# Patient Record
Sex: FEMALE | Race: WHITE | NOT HISPANIC OR LATINO | Employment: FULL TIME | URBAN - METROPOLITAN AREA
[De-identification: names, ages, dates, MRNs, and addresses within clinical notes are randomized per-mention and may not be internally consistent; named-entity substitution may affect disease eponyms.]

---

## 2017-02-22 ENCOUNTER — GENERIC CONVERSION - ENCOUNTER (OUTPATIENT)
Dept: OTHER | Facility: OTHER | Age: 22
End: 2017-02-22

## 2017-03-09 ENCOUNTER — ALLSCRIPTS OFFICE VISIT (OUTPATIENT)
Dept: OTHER | Facility: OTHER | Age: 22
End: 2017-03-09

## 2017-03-09 DIAGNOSIS — N76.0 ACUTE VAGINITIS: ICD-10-CM

## 2017-03-09 DIAGNOSIS — Z11.3 ENCOUNTER FOR SCREENING FOR INFECTIONS WITH PREDOMINANTLY SEXUAL MODE OF TRANSMISSION: ICD-10-CM

## 2017-03-09 DIAGNOSIS — R35.0 FREQUENCY OF MICTURITION: ICD-10-CM

## 2017-03-15 ENCOUNTER — GENERIC CONVERSION - ENCOUNTER (OUTPATIENT)
Dept: OTHER | Facility: OTHER | Age: 22
End: 2017-03-15

## 2017-03-21 ENCOUNTER — ALLSCRIPTS OFFICE VISIT (OUTPATIENT)
Dept: OTHER | Facility: OTHER | Age: 22
End: 2017-03-21

## 2017-03-21 LAB
BACTERIA UR QL AUTO: NORMAL
CLUE CELL (HISTORICAL): NORMAL
HYPHAL YEAST (HISTORICAL): NORMAL
KOH PREP (HISTORICAL): NORMAL
TRICHOMONAS (HISTORICAL): NORMAL
YEAST (HISTORICAL): NORMAL

## 2017-04-05 ENCOUNTER — ALLSCRIPTS OFFICE VISIT (OUTPATIENT)
Dept: OTHER | Facility: OTHER | Age: 22
End: 2017-04-05

## 2017-07-27 ENCOUNTER — GENERIC CONVERSION - ENCOUNTER (OUTPATIENT)
Dept: OTHER | Facility: OTHER | Age: 22
End: 2017-07-27

## 2017-10-12 ENCOUNTER — ALLSCRIPTS OFFICE VISIT (OUTPATIENT)
Dept: OTHER | Facility: OTHER | Age: 22
End: 2017-10-12

## 2017-12-27 ENCOUNTER — GENERIC CONVERSION - ENCOUNTER (OUTPATIENT)
Dept: OTHER | Facility: OTHER | Age: 22
End: 2017-12-27

## 2018-01-09 NOTE — PROGRESS NOTES
Assessment    1  Increased frequency of urination (788 41) (R35 0)   2  Vaginitis (616 10) (N76 0)    Plan  Increased frequency of urination    · (1) URINE CULTURE; Source:Urine, Clean Catch; Status:Active - Retrospective  Authorization; Requested YDF:[id number];    Perform:[facility]; CVK:[id number]; Last Updated By:Awilda León; 3/9/2017 4:29:24 PM;Ordered; For:Increased frequency of urination; Ordered By:Awilda León; Discussion/Summary  Discussion Summary:   26 yo   vaginitis: on speculum exam, erythema of the cervix noted around the os  No abnormal discharge noted, KOH wet mount negative  Waiting for results from [facility]  Release of records from completed today  will review records and see what tests need to be ordered  urinary frequency and urgency: with 500+ leukocytes, +blood, negative nitrites - well send urine for culture and treat as indicated  Pt will call office on Monday, 3/13/17 to inquire about Results from [facility] and inquire urine culture results  Medication SE Review and Pt Understands Tx: The treatment plan was reviewed with the patient/guardian  The patient/guardian understands and agrees with the treatment plan      Chief Complaint  Chief Complaint Free Text Note Form: pt is here with yellow discharge, vaginal itching and irritation and burning upon urination for 1-2 weeks      History of Present Illness  HPI: 26 yo  presents to office for vaginal discharge  She notes she had a yeast infection about 2 months ago, she treated it with OTC monistat which provided her relief  About 4-5 weeks ago she had recurrence of vaginal discharge and went to [facility] to be evaluated where they did a series of tests, but never received results  On exam at the hospital she was told she had a yeast infection and bacterial vaginosis and was prescribed an antibiotic, which she does not recall the name of   She states she completed the course early Feb  Now about for about 2 weeks she notes white creamy discharge with vaginal itching, and increased urinary urgency and frequency but not dysuria  Review of Systems   Female ROS: vaginal discharge, vaginal itching, urinary frequency and feelings of urinary urgency, but no vaginal odor, no nonmenstrual bleeding, no dysuria, no hematuria and no burning sensation during urination  Focused-Female:   Constitutional: no fever, no chills and not feeling tired  Cardiovascular: no chest pain  Respiratory: no shortness of breath  Gastrointestinal: nausea, but no abdominal pain  ROS Reviewed:   ROS reviewed  Active Problems    1  Anemia (285 9) (D64 9)   2  Anxiety (300 00) (F41 9)   3  Asthma (493 90) (J45 909)   4  Encounter for annual routine gynecological examination (V72 31) (Z01 419)   5  Encounter for contraceptive management (V25 9) (Z30 9)   6  Encounter for Depo-Provera contraception (V25 49) (Z30 42)   7  Obesity (278 00) (E66 9)   8  Obesity complicating pregnancy, childbirth, or puerperium, antepartum (649 13,278 00)   (O99 210,E66 9)   9  Postpartum follow-up (V24 2) (Z39 2)   10  Pregnancy (V22 2) (Z33 1)   11  Rh negative status during pregnancy (V23 89) (O09 899)   12  Screen for STD (sexually transmitted disease) (V74 5) (Z11 3)   13  Ventral hernia (553 20) (K43 9)    Past Medical History    1  History of Acute Gonorrhea (098 0)   2  History of Age At First Period 10 Years Old (Menarche)   3  History of Blighted Ovum (631 8)   4  History of Chlamydial Disease (078 88)   5  History of Elective  (635 90)   6  History of Spontaneous  (634 90)  Active Problems And Past Medical History Reviewed: The active problems and past medical history were reviewed and updated today  Surgical History    1  History of Oral Surgery Tooth Extraction  Surgical History Reviewed: The surgical history was reviewed and updated today  Family History  Father    1   No pertinent family history  Family History    2  Family history of Cancer   3  Family history of Diabetes Mellitus (V18 0)   4  Family history of Heart Disease (V17 49)   5  Family history of Hyperlipidemia  Family History Reviewed: The family history was reviewed and updated today  Social History    · Denied: History of Alcohol Use (History)   · Birth Control Method - Intramuscular Injection   · Current Every Day Smoker (305 1)   · Denied: History of Home Environment Domestic Violence   · Sexually Active  Social History Reviewed: The social history was reviewed and updated today  Current Meds   1  MedroxyPROGESTERone Acetate 150 MG/ML Intramuscular Suspension; INJECT 1  ML   Intramuscular every 11 weeks; Recurring Schedule:46Dqg9932 to (Exp:99Ikk1333); Status: IN PROGRESS - Order Generated Ordered   2  MedroxyPROGESTERone Acetate 150 MG/ML Intramuscular Suspension; INJECT   EVERY 11 WEEKS AS DIRECTED; Therapy: 57OKT6127 to (Last Rx:43Ikm7006)  Requested for: 39BSU6925 Ordered   3  Ventolin  (90 Base) MCG/ACT Inhalation Aerosol Solution; INHALE 1 TO 2 PUFFS   EVERY 4 TO 6 HOURS AS NEEDED; Therapy: 07OYZ6067 to (Last Rx:75Dnj1373)  Requested for: 59Jef1346 Ordered  Medication List Reviewed: The medication list was reviewed and updated today  Allergies    1  Amoxicillin CAPS   2  Azithromycin PACK   3  Penicillins   4  Vicodin TABS    5  Animal dander - Cats   6  Animal dander - Dogs   7  Other   8  Peanuts   9  Seasonal   10  Shellfish   11  Soy   12  [address]   13  Wheat    Vitals  Vital Signs    Recorded: 81KUC4193 02:59PM   Heart Rate 83   Systolic 559, LUE, Sitting   Diastolic 82, LUE, Sitting   Weight 185 lb 2 oz   2-20 Weight Percentile 95 %     Physical Exam    Constitutional   General appearance: No acute distress, well appearing and well nourished  Genitourinary   External genitalia: Normal and no lesions appreciated  Vagina: Normal, no lesions or dryness appreciated    slight erythema of the cervix  KOH wet mount: no clue cells noted  Attending Note  Attending Note: Attending Note: I interviewed and examined the patient, I discussed the case with the Resident and reviewed the Resident's note, I supervised the Resident and I agree with the Resident management plan as it was presented to me  Level of Participation: I was present in clinic and examined the patient  Comments/Additional Findings: Pt signed a release of information paper today  I agree with the Resident's note        Future Appointments    Date/Time Provider Specialty Site   05/10/2017 01:15 PM [facility], [address]     Signatures   Electronically signed by : Varney Scheuermann, MD; Mar  9 2017  4:22PM EST                       (Author)    Electronically signed by : Varney Scheuermann, MD; Mar  9 2017  4:29PM EST                       (Author)    Electronically signed by : Charity De Luna MD; Mar  9 2017  5:03PM EST                       (Author)

## 2018-01-10 NOTE — MISCELLANEOUS
Message   Recorded as Task   Date: 01/07/2016 04:31 PM, Created By: Pili Steele   Task Name: Follow Up   Assigned To: Joey Davis   Regarding Patient: Romi Merritt, Status: Active   Comment:    SusanJan - 07 Jan 2016 4:31 PM     TASK CREATED  nomi xie    i'm confused about this pt   Soumya Alvarado   her blood type is A pos   please advise   thanks Dillon Gia - 08 Jan 2016 11:47 AM     TASK REPLIED TO: Previously Assigned To Nam Longoria  I thoughth I saw a type A neg in the scan bin, but I may have mis-read the lab result  If you can double check the scan bin that would help  Thank you  SusanJan - 12 Jan 2016 11:22 AM     TASK REPLIED TO: Previously Assigned To Joey Davis  this is very strange   her type & screen of 12/15/15 shows A neg   type & screen of 8/2014 show A pos  ( you are right, the latest type & screen was just scanned in within the past day  how do you want me to proceed?  dr Lawrence Boland is here & is recommending to repeat it   Nam Longoria - 19 Jan 2016 2:21 PM     TASK REPLIED TO: Previously Assigned To Nam Longoria  OK to manage as Rh positive  Active Problems    1  Anemia (285 9) (D64 9)   2  Asthma (493 90) (J45 909)   3  Obesity (278 00) (E66 9)   4  Obesity complicating pregnancy, childbirth, or puerperium, antepartum (649 13,278 00)   (O99 210,E66 9)   5  Postpartum follow-up (V24 2) (Z39 2)   6  Pregnancy (V22 2) (Z33 1)   7  Rh negative status during pregnancy (656 10) (O36 0190)   8  Ventral hernia (553 20) (K43 9)    Current Meds   1  Dulcolax Stool Softener 100 MG Oral Capsule; TAKE 1 CAPSULE TWICE DAILY; Therapy: 17YLW8444 to (Evaluate:27Jan2016)  Requested for: 59Bzq9248; Last   Rx:80Nng5814 Ordered   2  Ferrous Sulfate 325 (65 Fe) MG Oral Tablet; TAKE 1 TABLET TWICE DAILY WITH   MEALS; Therapy: 93VXJ7216 to (Evaluate:26Apr2016)  Requested for: 10DEG8723; Last   Rx:87Onb2535 Ordered   3   Prenatal 28-0 8 MG Oral Tablet; take 1 tablet by mouth every day; Therapy: 22SGM7418 to (Last Rx:17Nov2015)  Requested for: 31MQR5599 Ordered   4  Ventolin  (90 Base) MCG/ACT Inhalation Aerosol Solution; INHALE 1 TO 2   PUFFS EVERY 4 TO 6 HOURS AS NEEDED; Therapy: 31XGN6992 to (Last Rx:12Jft2304)  Requested for: 87Xcz6528 Ordered    Allergies    1  Amoxicillin CAPS   2  Azithromycin PACK   3  Penicillins   4  Vicodin TABS    5  Animal dander - Cats   6  Animal dander - Dogs   7  Other   8  Peanuts   9  Seasonal   10  Shellfish   11  Soy   12  [address]   13   Wheat    Signatures   Electronically signed by : Cherylene Hang, RN; Jan 20 2016  3:47PM EST                       (Author)

## 2018-01-12 NOTE — MISCELLANEOUS
Message   Recorded as Task   Date: 03/15/2017 01:47 PM, Created By: Lety Can   Task Name: Call Back   Assigned To: [address] Team   Regarding Patient: Radha Bobo, Status: Active   Kaykay Bethea - 15 Mar 2017 1:47 PM     TASK CREATED  Pt  called requesting Urine cx results from 03/09/17   SusanJan - 15 Mar 2017 3:57 PM     TASK EDITED  called pt  -informed of urine culture results  WNL   pt is requesting an apt for STD screening- apt given for 3/21/17        Active Problems    1  Anemia (285 9) (D64 9)   2  Anxiety (300 00) (F41 9)   3  Asthma (493 90) (J45 909)   4  Encounter for annual routine gynecological examination (V72 31) (Z01 419)   5  Encounter for contraceptive management (V25 9) (Z30 9)   6  Encounter for Depo-Provera contraception (V25 49) (Z30 42)   7  Increased frequency of urination (788 41) (R35 0)   8  Obesity (278 00) (E66 9)   9  Obesity complicating pregnancy, childbirth, or puerperium, antepartum (649 13,278 00)   (O99 210,E66 9)   10  Postpartum follow-up (V24 2) (Z39 2)   11  Pregnancy (V22 2) (Z33 1)   12  Rh negative status during pregnancy (V23 89) (O09 899)   13  Screen for STD (sexually transmitted disease) (V74 5) (Z11 3)   14  Vaginitis (616 10) (N76 0)   15  Ventral hernia (553 20) (K43 9)    Current Meds   1  MedroxyPROGESTERone Acetate 150 MG/ML Intramuscular Suspension   (Depo-Provera); INJECT EVERY 11 WEEKS AS DIRECTED; Therapy: 47VYZ1574 to (Last Rx:15Nkg4092)  Requested for: 57KEM7412   Ordered   2  MedroxyPROGESTERone Acetate 150 MG/ML Intramuscular Suspension; INJECT 1  ML   Intramuscular every 11 weeks; Recurring Schedule:81Zye9062 to (Exp:35Zmx2274); Status: IN PROGRESS - Order Generated Ordered   3  Ventolin  (90 Base) MCG/ACT Inhalation Aerosol Solution; INHALE 1 TO 2   PUFFS EVERY 4 TO 6 HOURS AS NEEDED; Therapy: 65STN4524 to (Last Rx:67Xpt0267)  Requested for: 99Fab6366 Ordered    Allergies    1  Amoxicillin CAPS   2  Azithromycin PACK   3  Penicillins   4  Vicodin TABS    5  Animal dander - Cats   6  Animal dander - Dogs   7  Other   8  Peanuts   9  Seasonal   10  Shellfish   11  Soy   12  [address]   13   Wheat    Signatures   Electronically signed by : Kulwinder Lizarraga RN; Mar 15 2017  3:57PM EST                       (Author)

## 2018-01-13 VITALS
SYSTOLIC BLOOD PRESSURE: 115 MMHG | DIASTOLIC BLOOD PRESSURE: 79 MMHG | WEIGHT: 182.25 LBS | HEART RATE: 79 BPM | BODY MASS INDEX: 32.28 KG/M2

## 2018-01-13 VITALS
WEIGHT: 185.13 LBS | HEART RATE: 83 BPM | BODY MASS INDEX: 32.79 KG/M2 | DIASTOLIC BLOOD PRESSURE: 82 MMHG | SYSTOLIC BLOOD PRESSURE: 123 MMHG

## 2018-01-14 VITALS
RESPIRATION RATE: 75 BRPM | DIASTOLIC BLOOD PRESSURE: 85 MMHG | SYSTOLIC BLOOD PRESSURE: 119 MMHG | WEIGHT: 182.38 LBS | BODY MASS INDEX: 32.31 KG/M2

## 2018-01-15 NOTE — MISCELLANEOUS
Reason For Visit  Reason For Visit Free Text Note Form: P/C from [address]- [facility] has accepted case for parenting thru [address]  Visit scheduled for 1/18/16-      Active Problems    1  Anemia (285 9) (D64 9)   2  Asthma (493 90) (J45 909)   3  Obesity (278 00) (E66 9)   4  Obesity complicating pregnancy, childbirth, or puerperium, antepartum (649 13,278 00)   (O99 210,E66 9)   5  Postpartum follow-up (V24 2) (Z39 2)   6  Pregnancy (V22 2) (Z33 1)   7  Rh negative status during pregnancy (656 10) (O36 0190)   8  Ventral hernia (553 20) (K43 9)    Current Meds   1  Dulcolax Stool Softener 100 MG Oral Capsule; TAKE 1 CAPSULE TWICE DAILY; Therapy: 93YXQ9746 to (Evaluate:27Jan2016)  Requested for: 21Hnv8097; Last   Rx:24Qdq2570 Ordered   2  Ferrous Sulfate 325 (65 Fe) MG Oral Tablet; TAKE 1 TABLET TWICE DAILY WITH MEALS; Therapy: 90PEA3937 to (Evaluate:26Apr2016)  Requested for: 11JKL2731; Last   Rx:30Hrq9946 Ordered   3  Prenatal 28-0 8 MG Oral Tablet; take 1 tablet by mouth every day; Therapy: 16YVY3072 to (Last Rx:17Nov2015)  Requested for: 40OXM4462 Ordered   4  Ventolin  (90 Base) MCG/ACT Inhalation Aerosol Solution; INHALE 1 TO 2 PUFFS   EVERY 4 TO 6 HOURS AS NEEDED; Therapy: 66PRM9037 to (Last Rx:48Nve2485)  Requested for: 39Cba7505 Ordered    Allergies    1  Amoxicillin CAPS   2  Azithromycin PACK   3  Penicillins   4  Vicodin TABS    5  Animal dander - Cats   6  Animal dander - Dogs   7  Other   8  Peanuts   9  Seasonal   10  Shellfish   11  Soy   12  [address]   13   Wheat    Signatures   Electronically signed by : BROOKLYNN Jansen; Joey 15 2016  5:01PM EST                       (Author)

## 2018-01-17 NOTE — MISCELLANEOUS
To whom it may concern,   It is OK for Deysi to go back to work, I recommend no lifting over 20 lbs for now       sincerely, Nella Domínguez      Electronically signed by:Louisa Garg  Jan 13 2016 11:31AM EST

## 2018-01-18 NOTE — PROGRESS NOTES
Chief Complaint  patient here for depo      Active Problems    1  Anemia (285 9) (D64 9)   2  Anxiety (300 00) (F41 9)   3  Asthma (493 90) (J45 909)   4  Encounter for annual routine gynecological examination (V72 31) (Z01 419)   5  Encounter for contraceptive management (V25 9) (Z30 9)   6  Encounter for Depo-Provera contraception (V25 49) (Z30 42)   7  Increased frequency of urination (788 41) (R35 0)   8  Obesity (278 00) (E66 9)   9  Obesity complicating pregnancy, childbirth, or puerperium, antepartum (649 13,278 00)   (O99 210,E66 9)   10  Postpartum follow-up (V24 2) (Z39 2)   11  Pregnancy (V22 2) (Z34 90)   12  Rh negative status during pregnancy (V23 89) (O09 899)   13  Screen for STD (sexually transmitted disease) (V74 5) (Z11 3)   14  Vaginitis (616 10) (N76 0)   15  Ventral hernia (553 20) (K43 9)   16  Yeast infection (112 9) (B37 9)    Current Meds   1  MedroxyPROGESTERone Acetate 150 MG/ML Intramuscular Suspension; INJECT   EVERY 11 WEEKS AS DIRECTED; Therapy: 82WSU4366 to (Last Rx:06Oct2017)  Requested for: 09Oct2017 Ordered   2  Ventolin  (90 Base) MCG/ACT Inhalation Aerosol Solution; INHALE 1 TO 2   PUFFS EVERY 4 TO 6 HOURS AS NEEDED; Therapy: 21KJT4845 to (Last Rx:29Wte2368)  Requested for: 67Wpq3978 Ordered    Allergies    1  Amoxicillin CAPS   2  Azithromycin PACK   3  Penicillins   4  Vicodin TABS    5  Animal dander - Cats   6  Animal dander - Dogs   7  Other   8  Peanuts   9  Seasonal   10  Shellfish   11  Soy   12  [address]   13   Wheat    Vitals  Signs    Heart Rate: 62  Systolic: 981, LUE, Sitting  Diastolic: 70, LUE, Sitting  Height: 5 ft 3 in  Weight: 182 lb 3 2 oz  BMI Calculated: 32 28  BSA Calculated: 1 86    Plan  Encounter for Depo-Provera contraception    · MedroxyPROGESTERone Acetate 150 MG/ML Intramuscular Suspension    Signatures   Electronically signed by : Haylee Mendiola, ; Oct 12 2017 11:04AM EST                       (Author)    Electronically signed by : Kelsey Gilmore Jeison Caro; Oct 12 2017  5:57PM EST                       (Author)    Electronically signed by :  Mecca Feldman MD; Oct 17 2017  2:51PM EST                       (Acknowledgement)

## 2018-01-22 VITALS
WEIGHT: 179.13 LBS | SYSTOLIC BLOOD PRESSURE: 113 MMHG | BODY MASS INDEX: 31.73 KG/M2 | DIASTOLIC BLOOD PRESSURE: 90 MMHG | HEART RATE: 96 BPM

## 2018-01-22 VITALS
WEIGHT: 183.38 LBS | BODY MASS INDEX: 32.48 KG/M2 | SYSTOLIC BLOOD PRESSURE: 105 MMHG | DIASTOLIC BLOOD PRESSURE: 65 MMHG | HEART RATE: 96 BPM

## 2018-01-22 VITALS
HEART RATE: 62 BPM | SYSTOLIC BLOOD PRESSURE: 107 MMHG | WEIGHT: 182.2 LBS | BODY MASS INDEX: 32.28 KG/M2 | DIASTOLIC BLOOD PRESSURE: 70 MMHG | HEIGHT: 63 IN

## 2018-01-24 VITALS
WEIGHT: 175.13 LBS | HEART RATE: 62 BPM | BODY MASS INDEX: 31.02 KG/M2 | SYSTOLIC BLOOD PRESSURE: 101 MMHG | DIASTOLIC BLOOD PRESSURE: 63 MMHG

## 2018-02-09 ENCOUNTER — OFFICE VISIT (OUTPATIENT)
Dept: OBGYN CLINIC | Facility: CLINIC | Age: 23
End: 2018-02-09
Payer: COMMERCIAL

## 2018-02-09 VITALS
DIASTOLIC BLOOD PRESSURE: 72 MMHG | HEART RATE: 69 BPM | HEIGHT: 63 IN | SYSTOLIC BLOOD PRESSURE: 106 MMHG | BODY MASS INDEX: 30.02 KG/M2 | WEIGHT: 169.4 LBS

## 2018-02-09 DIAGNOSIS — Z20.2 POSSIBLE EXPOSURE TO STD: ICD-10-CM

## 2018-02-09 DIAGNOSIS — Z01.419 ENCOUNTER FOR GYNECOLOGICAL EXAMINATION WITHOUT ABNORMAL FINDING: Primary | ICD-10-CM

## 2018-02-09 PROCEDURE — 3725F SCREEN DEPRESSION PERFORMED: CPT | Performed by: NURSE PRACTITIONER

## 2018-02-09 PROCEDURE — 99395 PREV VISIT EST AGE 18-39: CPT | Performed by: NURSE PRACTITIONER

## 2018-02-09 NOTE — PROGRESS NOTES
Assessment             Plan      Blood tests: hiv, hep b sag and rpr  Subjective      Chely Barr is a 23 y o    female who presents for annual exam  Periods are irregular, lasting several days  Dysmenorrhea:mild, occurring premenstrually  Reported some vaginal discharge  prior to her menses but denies any itching burning her odor  She would like to return after her menses to be review this and she has a history bacterial vaginosis  She desires STI testing today  Cyclic symptoms include none  No intermenstrual bleeding, spotting, or discharge  The patient reports that there is not domestic violence in her life  Will follow with parents to see if she had Gardasil    Current contraception: Depo-Provera injections  History of abnormal Pap smear: no  Family history of uterine or ovarian cancer: no  Regular self breast exam: no  History of abnormal mammogram: no  Family history of breast cancer: no  History of abnormal lipids: no  Menstrual History:  OB History      Para Term  AB Living    5 2 2 0 3 2    SAB TAB Ectopic Multiple Live Births    2 1 0 0 2         Menarche age: 7  No LMP recorded    Period Pattern: (!) Irregular  Menstrual Control Change Freq (Hours): on Depo    The following portions of the patient's history were reviewed and updated as appropriate: allergies, current medications, past family history, past medical history, past social history, past surgical history and problem list     Review of Systems  Constitutional: negative  Respiratory: negative  Genitourinary:negative     Objective      /72 (BP Location: Left arm, Patient Position: Sitting)   Pulse 69   Ht 5' 3" (1 6 m)   Wt 76 8 kg (169 lb 6 4 oz)   BMI 30 01 kg/m²       General:   alert and oriented, in no acute distress, alert, appears stated age and cooperative   Heart: regular rate and rhythm, S1, S2 normal, no murmur, click, rub or gallop   Lungs: clear to auscultation bilaterally   Abdomen: soft, non-tender, without masses or organomegaly   Vulva: normal   Vagina: normal mucosa, normal discharge   Cervix: anteverted, no cervical motion tenderness and no lesions   Uterus: normal size   Adnexa: normal adnexa            Assessment        GYN exam WNL  Plan      Blood tests: hiv, hep b sag, rpr  Chlamydia specimen  Contraception: Depo-Provera injections  GC specimen  RTO in 2 wks for results and follow up  Next Depo is due 2018  Florence Ingram is a 23 y o  female who presents for annual exam  Periods are irregular, lasting unknown days  Dysmenorrhea:mild, occurring throughout menses  Cyclic symptoms include none  No intermenstrual bleeding, spotting, or discharge  The patient reports that there is not domestic violence in her life  Current contraception: Depo-Provera injections  History of abnormal Pap smear: no  Family history of uterine or ovarian cancer: no  Regular self breast exam: no  History of abnormal mammogram: no  Family history of breast cancer: no  History of abnormal lipids: no  Menstrual History:  OB History      Para Term  AB Living    5 2 2 0 3 2    SAB TAB Ectopic Multiple Live Births    2 1 0 0 2         Menarche age: 7  No LMP recorded  Period Pattern: (!) Irregular  Menstrual Control Change Freq (Hours): on Depo    The following portions of the patient's history were reviewed and updated as appropriate: allergies, current medications, past family history, past medical history, past social history, past surgical history and problem list     Review of Systems  Pertinent items are noted in HPI       Objective      /72 (BP Location: Left arm, Patient Position: Sitting)   Pulse 69   Ht 5' 3" (1 6 m)   Wt 76 8 kg (169 lb 6 4 oz)   BMI 30 01 kg/m²       General:   alert, appears stated age and cooperative   Heart: regular rate and rhythm, S1, S2 normal, no murmur, click, rub or gallop   Lungs: clear to auscultation bilaterally   Abdomen: soft, non-tender, without masses or organomegaly   Vulva: normal   Vagina: normal mucosa, normal discharge   Cervix: no cervical motion tenderness   Uterus: normal size, anteverted   Adnexa: normal adnexa

## 2018-02-12 ENCOUNTER — TELEPHONE (OUTPATIENT)
Dept: OBGYN CLINIC | Facility: CLINIC | Age: 23
End: 2018-02-12

## 2018-02-12 NOTE — TELEPHONE ENCOUNTER
I called pt b/c Gc/chlamydia specimen was collected for her on 2/9, to go to [facility]  Upon further review, her last insurance card that was scanned says her PCP is with [facility]  I spoke with pt and she verified that on her current card, it says that her lab is [facility], and she wants us to send her specimen to [facility]  Will send today

## 2018-03-07 NOTE — PROGRESS NOTES
Discussion/Summary    Regarding blood type: Inpatient labs reviewed [id number] with outpatient labs  Patient's type was A negative per health network, but [facility] Lab shows A positive  This is consistent with a discrepancy in labs when a patient is truly Rh positive with a WEAK-D variant  This does not require treatment with Rhogam  She should be treated as A positive        Signatures   Electronically signed by : Samaria Awad MD; Jan 19 2016 11:53AM EST                       (Author)

## 2018-03-29 ENCOUNTER — CLINICAL SUPPORT (OUTPATIENT)
Dept: OBGYN CLINIC | Facility: CLINIC | Age: 23
End: 2018-03-29
Payer: COMMERCIAL

## 2018-03-29 DIAGNOSIS — Z30.42 ENCOUNTER FOR DEPO-PROVERA CONTRACEPTION: Primary | ICD-10-CM

## 2018-03-29 PROCEDURE — 96372 THER/PROPH/DIAG INJ SC/IM: CPT | Performed by: OBSTETRICS & GYNECOLOGY

## 2018-03-29 RX ORDER — MEDROXYPROGESTERONE ACETATE 150 MG/ML
150 INJECTION, SUSPENSION INTRAMUSCULAR ONCE
Status: COMPLETED | OUTPATIENT
Start: 2018-03-29 | End: 2018-03-29

## 2018-03-29 RX ADMIN — MEDROXYPROGESTERONE ACETATE 150 MG: 150 INJECTION, SUSPENSION INTRAMUSCULAR at 10:53

## 2018-06-20 ENCOUNTER — OFFICE VISIT (OUTPATIENT)
Dept: OBGYN CLINIC | Facility: CLINIC | Age: 23
End: 2018-06-20
Payer: COMMERCIAL

## 2018-06-20 VITALS
SYSTOLIC BLOOD PRESSURE: 98 MMHG | WEIGHT: 171 LBS | BODY MASS INDEX: 30.3 KG/M2 | HEIGHT: 63 IN | HEART RATE: 58 BPM | DIASTOLIC BLOOD PRESSURE: 59 MMHG

## 2018-06-20 DIAGNOSIS — Z30.42 ENCOUNTER FOR DEPO-PROVERA CONTRACEPTION: ICD-10-CM

## 2018-06-20 DIAGNOSIS — R31.9 HEMATURIA, UNSPECIFIED TYPE: ICD-10-CM

## 2018-06-20 DIAGNOSIS — R39.9 URINARY SYMPTOM OR SIGN: Primary | ICD-10-CM

## 2018-06-20 LAB
SL AMB  POCT GLUCOSE, UA: NEGATIVE
SL AMB LEUKOCYTE ESTERASE,UA: NEGATIVE
SL AMB POCT BILIRUBIN,UA: NEGATIVE
SL AMB POCT BLOOD,UA: ABNORMAL
SL AMB POCT CLARITY,UA: CLEAR
SL AMB POCT COLOR,UA: YELLOW
SL AMB POCT KETONES,UA: NEGATIVE
SL AMB POCT NITRITE,UA: NEGATIVE
SL AMB POCT PH,UA: 6.5
SL AMB POCT SPECIFIC GRAVITY,UA: 1.01
SL AMB POCT URINE PROTEIN: NEGATIVE
SL AMB POCT UROBILINOGEN: 0.2

## 2018-06-20 PROCEDURE — 99213 OFFICE O/P EST LOW 20 MIN: CPT | Performed by: NURSE PRACTITIONER

## 2018-06-20 PROCEDURE — 96372 THER/PROPH/DIAG INJ SC/IM: CPT

## 2018-06-20 PROCEDURE — 81002 URINALYSIS NONAUTO W/O SCOPE: CPT | Performed by: NURSE PRACTITIONER

## 2018-06-20 PROCEDURE — 87086 URINE CULTURE/COLONY COUNT: CPT | Performed by: NURSE PRACTITIONER

## 2018-06-20 RX ORDER — NITROFURANTOIN 25; 75 MG/1; MG/1
100 CAPSULE ORAL 2 TIMES DAILY
Qty: 14 CAPSULE | Refills: 0 | Status: SHIPPED | OUTPATIENT
Start: 2018-06-20 | End: 2018-06-27

## 2018-06-20 RX ORDER — ALBUTEROL SULFATE 90 UG/1
1-2 AEROSOL, METERED RESPIRATORY (INHALATION)
COMMUNITY
Start: 2014-07-17

## 2018-06-20 RX ORDER — MEDROXYPROGESTERONE ACETATE 150 MG/ML
150 INJECTION, SUSPENSION INTRAMUSCULAR ONCE
Status: COMPLETED | OUTPATIENT
Start: 2018-06-20 | End: 2018-06-20

## 2018-06-20 RX ADMIN — MEDROXYPROGESTERONE ACETATE 150 MG: 150 INJECTION, SUSPENSION INTRAMUSCULAR at 14:11

## 2018-06-20 NOTE — PROGRESS NOTES
Assessment/Plan:  Will treat for a UTI  Call if symptoms not improved within 2 days  Next Depo due in 11 wks  Annual due 2019  Diagnoses and all orders for this visit:    Urinary symptom or sign  -     POCT urine dip  -     Chlamydia/GC amplified DNA by PCR; Future  -     nitrofurantoin (MACROBID) 100 mg capsule; Take 1 capsule (100 mg total) by mouth 2 (two) times a day for 7 days    Encounter for Depo-Provera contraception  -     medroxyPROGESTERone (DEPO-PROVERA) IM injection 150 mg; Inject 1 mL (150 mg total) into the shoulder, thigh, or buttocks once     Other orders  -     albuterol (VENTOLIN HFA) 90 mcg/act inhaler; Inhale 1-2 puffs          Subjective:      Patient ID: Uli Thacker is a 26 y o  female  HPI  25 yo  has Pelvic pressure for the past 3 weeks,  She denies any vaginal discharge, any itching  She feels like she has to urinate all the time  She does not , she feels a pressure  Denies any flank pain or fever or chills  Has a new partner  Since 2018  Uses condoms sometimes  Currently is on Depo and is due for her dose today  Urine dip is positive for moderate  blood however patient denies seeing any blood in her urine or from her vagina  She had her gallbladder removed 2018  Denies get her STI labs done that were ordered in February  Will Re print an add a new chlamydia and gonorrhea test      The following portions of the patient's history were reviewed and updated as appropriate: allergies, current medications, past family history, past medical history, past social history, past surgical history and problem list     Review of Systems   Genitourinary: Positive for dysuria, frequency and urgency  Negative for difficulty urinating, flank pain, vaginal bleeding, vaginal discharge and vaginal pain           Objective:      BP 98/59 (BP Location: Left arm, Patient Position: Sitting, Cuff Size: Adult)   Pulse 58   Ht 5' 3" (1 6 m)   Wt 77 6 kg (171 lb)   LMP  (LMP Unknown)   Breastfeeding? No   BMI 30 29 kg/m²          Physical Exam   Constitutional: She appears well-developed and well-nourished  Cardiovascular: Normal rate, regular rhythm and normal heart sounds  Pulmonary/Chest: Effort normal and breath sounds normal    Abdominal: Soft  There is no tenderness  Pressure type discomfort suprapubically

## 2018-06-21 LAB — BACTERIA UR CULT: NORMAL

## 2018-06-22 ENCOUNTER — TRANSCRIBE ORDERS (OUTPATIENT)
Dept: LAB | Facility: CLINIC | Age: 23
End: 2018-06-22

## 2018-06-22 ENCOUNTER — APPOINTMENT (OUTPATIENT)
Dept: LAB | Facility: CLINIC | Age: 23
End: 2018-06-22
Payer: COMMERCIAL

## 2018-06-22 DIAGNOSIS — Z20.2 POSSIBLE EXPOSURE TO STD: ICD-10-CM

## 2018-06-22 DIAGNOSIS — R39.9 URINARY SYMPTOM OR SIGN: ICD-10-CM

## 2018-06-22 PROCEDURE — 87389 HIV-1 AG W/HIV-1&-2 AB AG IA: CPT

## 2018-06-22 PROCEDURE — 86592 SYPHILIS TEST NON-TREP QUAL: CPT

## 2018-06-22 PROCEDURE — 87340 HEPATITIS B SURFACE AG IA: CPT

## 2018-06-22 PROCEDURE — 36415 COLL VENOUS BLD VENIPUNCTURE: CPT

## 2018-06-22 PROCEDURE — 87491 CHLMYD TRACH DNA AMP PROBE: CPT | Performed by: NURSE PRACTITIONER

## 2018-06-22 PROCEDURE — 87591 N.GONORRHOEAE DNA AMP PROB: CPT | Performed by: NURSE PRACTITIONER

## 2018-06-23 LAB — HBV SURFACE AG SER QL: NORMAL

## 2018-06-25 LAB
CHLAMYDIA DNA CVX QL NAA+PROBE: NORMAL
HIV 1+2 AB+HIV1 P24 AG SERPL QL IA: NORMAL
N GONORRHOEA DNA GENITAL QL NAA+PROBE: NORMAL
RPR SER QL: NORMAL

## 2018-06-26 ENCOUNTER — OFFICE VISIT (OUTPATIENT)
Dept: OBGYN CLINIC | Facility: CLINIC | Age: 23
End: 2018-06-26
Payer: COMMERCIAL

## 2018-06-26 VITALS
WEIGHT: 174.4 LBS | BODY MASS INDEX: 30.89 KG/M2 | TEMPERATURE: 97.9 F | HEART RATE: 76 BPM | DIASTOLIC BLOOD PRESSURE: 67 MMHG | SYSTOLIC BLOOD PRESSURE: 106 MMHG

## 2018-06-26 DIAGNOSIS — N39.0 URINARY TRACT INFECTION WITHOUT HEMATURIA, SITE UNSPECIFIED: Primary | ICD-10-CM

## 2018-06-26 DIAGNOSIS — M54.5 LOW BACK PAIN, UNSPECIFIED BACK PAIN LATERALITY, UNSPECIFIED CHRONICITY, WITH SCIATICA PRESENCE UNSPECIFIED: Primary | ICD-10-CM

## 2018-06-26 PROBLEM — M54.50 LOW BACK PAIN: Status: ACTIVE | Noted: 2018-06-26

## 2018-06-26 LAB
SL AMB  POCT GLUCOSE, UA: NEGATIVE
SL AMB LEUKOCYTE ESTERASE,UA: NEGATIVE
SL AMB POCT BILIRUBIN,UA: NEGATIVE
SL AMB POCT BLOOD,UA: ABNORMAL
SL AMB POCT CLARITY,UA: ABNORMAL
SL AMB POCT COLOR,UA: ABNORMAL
SL AMB POCT KETONES,UA: NEGATIVE
SL AMB POCT NITRITE,UA: NEGATIVE
SL AMB POCT URINE PROTEIN: NEGATIVE
SL AMB POCT UROBILINOGEN: 0.2

## 2018-06-26 PROCEDURE — 87086 URINE CULTURE/COLONY COUNT: CPT | Performed by: NURSE PRACTITIONER

## 2018-06-26 PROCEDURE — 99213 OFFICE O/P EST LOW 20 MIN: CPT | Performed by: NURSE PRACTITIONER

## 2018-06-26 PROCEDURE — 81002 URINALYSIS NONAUTO W/O SCOPE: CPT | Performed by: NURSE PRACTITIONER

## 2018-06-26 NOTE — PROGRESS NOTES
Assessment/Plan:    Urine culture was contaminated, repeat culture sent today  Call if symptoms get worse or concerned     Diagnoses and all orders for this visit:    Low back pain, unspecified back pain laterality, unspecified chronicity, with sciatica presence unspecified  -     Urine culture  -     POCT urine dip    Other orders  -     Cancel: Urine culture          Subjective:      Patient ID: Rosaura Diego is a 26 y o  female  HPI 72-year-old  here with complaints of lower back pain, she states the pain was worse yesterday where she almost had to leave work early  Today her symptoms are last   Patient was seen on 2018 and started on nitrofurantoin  for a UTI  When last seen she was having pressure which she states that has resolved  She denies fevers or chills  Her temperature today in the office is 97  Her urine came back mixed contaminants x4with colony count greater than 100,000, needs a repeat clean-catch urine  She also had STI testing done at last visit and all was negative  She is a Depo-Provera for contraception  The following portions of the patient's history were reviewed and updated as appropriate: allergies, current medications, past family history, past medical history, past social history, past surgical history and problem list     Review of Systems   Respiratory: Negative  Cardiovascular: Negative  Gastrointestinal: Negative for abdominal pain, nausea and vomiting  Genitourinary: Negative for difficulty urinating, dysuria, pelvic pain, urgency and vaginal discharge  Lower back to mid back pain  Denies any leg pain  Neurological: Negative  Psychiatric/Behavioral: Negative            Objective:      /67 (BP Location: Left arm, Patient Position: Sitting, Cuff Size: Adult)   Pulse 76   Temp 97 9 °F (36 6 °C) (Tympanic)   Wt 79 1 kg (174 lb 6 4 oz)   LMP  (LMP Unknown)   BMI 30 89 kg/m²          Physical Exam   Constitutional: She appears well-nourished  Cardiovascular: Normal rate, regular rhythm and normal heart sounds  Pulmonary/Chest: Effort normal and breath sounds normal    Abdominal: Soft  There is no tenderness  Psychiatric: She has a normal mood and affect  Her behavior is normal      Negative CVAT  Minimal lower back pain, appears to be musculoskeletal when palpated

## 2018-06-26 NOTE — PATIENT INSTRUCTIONS
Clean catch urine resent today  Await results, Call with concerns  Please increase your water intake

## 2018-06-27 ENCOUNTER — TELEPHONE (OUTPATIENT)
Dept: OBGYN CLINIC | Facility: CLINIC | Age: 23
End: 2018-06-27

## 2018-06-27 LAB — BACTERIA UR CULT: NORMAL

## 2018-06-27 NOTE — TELEPHONE ENCOUNTER
----- Message from Trace Araiza, [address] sent at 6/26/2018  7:41 AM EDT -----  Pt needs a repeat urine culture, please call to inform

## 2018-07-02 ENCOUNTER — TELEPHONE (OUTPATIENT)
Dept: OBGYN CLINIC | Facility: CLINIC | Age: 23
End: 2018-07-02

## 2018-07-02 DIAGNOSIS — B37.3 VAGINAL YEAST INFECTION: Primary | ICD-10-CM

## 2018-07-02 RX ORDER — FLUCONAZOLE 150 MG/1
150 TABLET ORAL ONCE
Qty: 1 TABLET | Refills: 0 | Status: SHIPPED | OUTPATIENT
Start: 2018-07-02 | End: 2018-07-02

## 2018-07-02 RX ORDER — FLUCONAZOLE 150 MG/1
150 TABLET ORAL ONCE
Qty: 1 TABLET | Refills: 0 | Status: CANCELLED | OUTPATIENT
Start: 2018-07-02 | End: 2018-07-02

## 2018-07-02 NOTE — TELEPHONE ENCOUNTER
Patient called stating she had been treated for UTI last week  Urinary symptoms are now gone but she is having issues with a yeast infection  D/W Ramón Brunson  RX for diflucan sent to pharmacy

## 2018-09-12 ENCOUNTER — CLINICAL SUPPORT (OUTPATIENT)
Dept: OBGYN CLINIC | Facility: CLINIC | Age: 23
End: 2018-09-12
Payer: COMMERCIAL

## 2018-09-12 DIAGNOSIS — Z30.42 ENCOUNTER FOR SURVEILLANCE OF INJECTABLE CONTRACEPTIVE: Primary | ICD-10-CM

## 2018-09-12 PROCEDURE — 96372 THER/PROPH/DIAG INJ SC/IM: CPT

## 2018-09-12 RX ORDER — MEDROXYPROGESTERONE ACETATE 150 MG/ML
150 INJECTION, SUSPENSION INTRAMUSCULAR ONCE
Status: COMPLETED | OUTPATIENT
Start: 2018-09-12 | End: 2018-09-12

## 2018-09-12 RX ADMIN — MEDROXYPROGESTERONE ACETATE 150 MG: 150 INJECTION, SUSPENSION INTRAMUSCULAR at 11:07

## 2018-09-12 NOTE — PROGRESS NOTES
Depo-Provera      [x]   Patient provided box yes  Paulette Drake    Last  Annual/Pap Date: 02/2018  Gema Pan Last Depo date: 06/20/18   Side effects: none   HCG; if applicable: n/a   Given by: Olayinka Mayberry CMA   Site: Left deltoid   Next appt  due: 11/28/18  

## 2018-11-26 DIAGNOSIS — Z30.42 ENCOUNTER FOR SURVEILLANCE OF INJECTABLE CONTRACEPTIVE: Primary | ICD-10-CM

## 2018-11-26 RX ORDER — MEDROXYPROGESTERONE ACETATE 150 MG/ML
150 INJECTION, SUSPENSION INTRAMUSCULAR
Qty: 1 ML | Refills: 0 | Status: SHIPPED | OUTPATIENT
Start: 2018-11-26 | End: 2019-02-18 | Stop reason: SDUPTHER

## 2019-02-18 DIAGNOSIS — Z30.42 ENCOUNTER FOR SURVEILLANCE OF INJECTABLE CONTRACEPTIVE: ICD-10-CM

## 2019-02-18 RX ORDER — MEDROXYPROGESTERONE ACETATE 150 MG/ML
150 INJECTION, SUSPENSION INTRAMUSCULAR
Qty: 1 ML | Refills: 3 | Status: SHIPPED | OUTPATIENT
Start: 2019-02-18 | End: 2019-02-19 | Stop reason: SDUPTHER

## 2019-02-19 ENCOUNTER — OFFICE VISIT (OUTPATIENT)
Dept: OBGYN CLINIC | Facility: CLINIC | Age: 24
End: 2019-02-19

## 2019-02-19 VITALS
HEIGHT: 64 IN | DIASTOLIC BLOOD PRESSURE: 71 MMHG | WEIGHT: 172.4 LBS | SYSTOLIC BLOOD PRESSURE: 108 MMHG | HEART RATE: 61 BPM | BODY MASS INDEX: 29.43 KG/M2

## 2019-02-19 DIAGNOSIS — Z01.419 ENCOUNTER FOR ANNUAL ROUTINE GYNECOLOGICAL EXAMINATION: Primary | ICD-10-CM

## 2019-02-19 DIAGNOSIS — Z11.3 ROUTINE SCREENING FOR STI (SEXUALLY TRANSMITTED INFECTION): ICD-10-CM

## 2019-02-19 DIAGNOSIS — Z30.42 ENCOUNTER FOR SURVEILLANCE OF INJECTABLE CONTRACEPTIVE: ICD-10-CM

## 2019-02-19 LAB — SL AMB POCT URINE HCG: NORMAL

## 2019-02-19 PROCEDURE — G0124 SCREEN C/V THIN LAYER BY MD: HCPCS | Performed by: PATHOLOGY

## 2019-02-19 PROCEDURE — G0145 SCR C/V CYTO,THINLAYER,RESCR: HCPCS | Performed by: PATHOLOGY

## 2019-02-19 PROCEDURE — 87491 CHLMYD TRACH DNA AMP PROBE: CPT | Performed by: NURSE PRACTITIONER

## 2019-02-19 PROCEDURE — 87591 N.GONORRHOEAE DNA AMP PROB: CPT | Performed by: NURSE PRACTITIONER

## 2019-02-19 PROCEDURE — 81025 URINE PREGNANCY TEST: CPT | Performed by: NURSE PRACTITIONER

## 2019-02-19 PROCEDURE — 99395 PREV VISIT EST AGE 18-39: CPT | Performed by: NURSE PRACTITIONER

## 2019-02-19 PROCEDURE — 3725F SCREEN DEPRESSION PERFORMED: CPT | Performed by: NURSE PRACTITIONER

## 2019-02-19 RX ORDER — MEDROXYPROGESTERONE ACETATE 150 MG/ML
150 INJECTION, SUSPENSION INTRAMUSCULAR ONCE
Status: COMPLETED | OUTPATIENT
Start: 2019-02-19 | End: 2019-02-19

## 2019-02-19 RX ORDER — MEDROXYPROGESTERONE ACETATE 150 MG/ML
150 INJECTION, SUSPENSION INTRAMUSCULAR
Qty: 1 ML | Refills: 3 | Status: SHIPPED | OUTPATIENT
Start: 2019-02-19 | End: 2020-05-26

## 2019-02-19 RX ADMIN — MEDROXYPROGESTERONE ACETATE 150 MG: 150 INJECTION, SUSPENSION INTRAMUSCULAR at 10:18

## 2019-02-19 NOTE — PROGRESS NOTES
ASSESSMENT & PLAN: Juan Soria is a 26 y o  [id number] with normal gynecologic exam     1   Routine well woman exam done today  2  Pap and HPV:  Patient has never had a Pap    Pap was done today  Current ASCCP Guidelines reviewed  If negative due in 3 years  3  STD testing consents to culture for chlamydia gonorrhea,  was done labs for HIV, RPR, hepatitis-B SAG given  4  Gardasil recommendations reviewed patient will check to see if she was vaccinated through her PCP   5  The following were reviewed in today's visit: breast self exam, safe sexual practices, depo contraception, gardisil vaccine  6  Depo Provera 150 mgs 1 ml with 3 RF sent  RTO in 11 wks for next Depo injection  CC:  Annual Gynecologic Examination    HPI: Juan Soria is a 26 y o  [id number] who presents for annual gynecologic examination  She has the following concerns:  none    Health Maintenance:    She wears her seatbelt routinely  She does perform regular monthly self breast exams  She feels safe at home  Past Medical History:   Diagnosis Date    Asthma        Past Surgical History:   Procedure Laterality Date    CHOLECYSTECTOMY      lap diego 2017    HERNIA REPAIR  2016    hernia x3, laparoscopic    WISDOM TOOTH EXTRACTION         OB/Gyn History:    Pt does not have menstrual issues  Irregular with Depo    History of sexually transmitted infection: Yes  Chlamydia and gonorrhea, Trichomonas  History of abnormal pap smears: No  This is 1st pap  Patient is currently sexually active  The current method of family planning is Depo-Provera injections      OB History        5    Para   2    Term   2       0    AB   3    Living   2       SAB   2    TAB   1    Ectopic   0    Multiple   0    Live Births   2                 Family History   Problem Relation Age of Onset    No Known Problems Mother     No Known Problems Father     No Known Problems Sister     No Known Problems Brother        Social History:  Social History     Socioeconomic History    Marital status: Single     Spouse name: Not on file    Number of children: Not on file    Years of education: Not on file    Highest education level: Not on file   Occupational History    Not on file   Social Needs    Financial resource strain: Not on file    Food insecurity:     Worry: Not on file     Inability: Not on file    Transportation needs:     Medical: Not on file     Non-medical: Not on file   Tobacco Use    Smoking status: Former Smoker     Packs/day: 0 00     Types: Cigarettes     Last attempt to quit: 2018     Years since quittin 7    Smokeless tobacco: Never Used    Tobacco comment: Quit smoking a while ago, but recently started lightly smoking 1 month ago   Substance and Sexual Activity    Alcohol use: No    Drug use: No    Sexual activity: Yes     Partners: Male     Birth control/protection: Injection     Comment: says her male partner has more than 1 sexual partner   Lifestyle    Physical activity:     Days per week: Not on file     Minutes per session: Not on file    Stress: Not on file   Relationships    Social connections:     Talks on phone: Not on file     Gets together: Not on file     Attends Gnosticist service: Not on file     Active member of club or organization: Not on file     Attends meetings of clubs or organizations: Not on file     Relationship status: Not on file    Intimate partner violence:     Fear of current or ex partner: Not on file     Emotionally abused: Not on file     Physically abused: Not on file     Forced sexual activity: Not on file   Other Topics Concern    Not on file   Social History Narrative    Not on file     Patient is single  Patient is currently employed home care    Allergies   Allergen Reactions    Penicillins Other (See Comments)     Pt states "I have not taken them since I was very young and am not sure what happens   Probably hives "    Cat Hair Extract     Dog Epithelium     Isoflavones     Nuts     Other      Annotation - [id number]: carrots    Pollen Extract     Wheat Bran     Amoxicillin Hives    Azithromycin Hives    Vicodin [Hydrocodone-Acetaminophen] Hives         Current Outpatient Medications:     albuterol (VENTOLIN HFA) 90 mcg/act inhaler, Inhale 1-2 puffs, Disp: , Rfl:     medroxyPROGESTERone (DEPO-PROVERA) 150 mg/mL injection, Inject 1 mL (150 mg total) into a muscle every 3 (three) months, Disp: 1 mL, Rfl: 3    Review of Systems:  Constitutional :no fever, feels well, no tiredness, no recent weight gain or loss  ENT: no ear ache, no loss of hearing, no nosebleeds or nasal discharge, no sore throat or hoarseness  Cardiovascular: no complaints of slow or fast heart beat, no chest pain, no palpitations, no leg claudication or lower extremity edema  Respiratory: no complaints of shortness of shortness of breath, no BOWEN  Breasts:no complaints of breast pain, breast lump, or nipple discharge  Gastrointestinal: no complaints of abdominal pain, constipation, nausea, vomiting, or diarrhea or bloody stools  Genitourinary : no complaints of dysuria, incontinence, pelvic pain, no dysmenorrhea, vaginal discharge or abnormal vaginal bleeding and as noted in HPI  Musculoskeletal: no complaints of arthralgia, no myalgia, no joint swelling or stiffness, no limb pain or swelling    Integumentary: no complaints of skin rash or lesion, itching or dry skin  Neurological: no complaints of headache, no confusion, no numbness or tingling, no dizziness or fainting    Objective      /71 (BP Location: Right arm, Patient Position: Sitting, Cuff Size: Large)   Pulse 61   Ht 5' 3 5" (1 613 m)   Wt 78 2 kg (172 lb 6 4 oz)   LMP  (LMP Unknown)   BMI 30 06 kg/m²     General:   appears stated age, cooperative, alert normal mood and affect   Neck: normal, supple,trachea midline, no masses   Heart: regular rate and rhythm, S1, S2 normal, no murmur, click, rub or gallop Lungs: clear to auscultation bilaterally   Breasts: normal appearance, no masses or tenderness, Inspection negative, Normal to palpation without dominant masses, Taught monthly breast self examination   Abdomen: soft, non-tender, without masses or organomegaly   Vulva: normal female genitalia, Bartholin's, Urethra, Rotan normal   Vagina: normal vagina, no discharge, exudate, lesion, or erythema   Urethra: normal   Cervix: Normal, no discharge  PAP done  GCC done  Nontender  Uterus: normal size, contour, position, consistency, mobility, non-tender and anteverted   Adnexa: no mass, fullness, tenderness   Lymphatic palpation of lymph nodes in neck, axilla, groin and/or other locations: no lymphadenopathy or masses noted   Skin normal skin turgor and no rashes     Psychiatric orientation to person, place, and time: normal  mood and affect: normal

## 2019-02-20 LAB
C TRACH DNA SPEC QL NAA+PROBE: NEGATIVE
N GONORRHOEA DNA SPEC QL NAA+PROBE: NEGATIVE

## 2019-02-22 LAB
LAB AP GYN PRIMARY INTERPRETATION: ABNORMAL
Lab: ABNORMAL
PATH INTERP SPEC-IMP: ABNORMAL

## 2019-03-26 ENCOUNTER — OFFICE VISIT (OUTPATIENT)
Dept: OBGYN CLINIC | Facility: CLINIC | Age: 24
End: 2019-03-26

## 2019-03-26 VITALS
DIASTOLIC BLOOD PRESSURE: 81 MMHG | HEART RATE: 89 BPM | SYSTOLIC BLOOD PRESSURE: 119 MMHG | HEIGHT: 63 IN | BODY MASS INDEX: 32.07 KG/M2 | WEIGHT: 181 LBS

## 2019-03-26 DIAGNOSIS — R10.2 PELVIC PAIN: Primary | ICD-10-CM

## 2019-03-26 DIAGNOSIS — N93.9 VAGINAL SPOTTING: ICD-10-CM

## 2019-03-26 LAB
SL AMB  POCT GLUCOSE, UA: NEGATIVE
SL AMB LEUKOCYTE ESTERASE,UA: NEGATIVE
SL AMB POCT BILIRUBIN,UA: NEGATIVE
SL AMB POCT BLOOD,UA: ABNORMAL
SL AMB POCT CLARITY,UA: CLEAR
SL AMB POCT COLOR,UA: YELLOW
SL AMB POCT KETONES,UA: NEGATIVE
SL AMB POCT NITRITE,UA: NEGATIVE
SL AMB POCT PH,UA: 7.5
SL AMB POCT SPECIFIC GRAVITY,UA: 1.01
SL AMB POCT URINE PROTEIN: NEGATIVE
SL AMB POCT UROBILINOGEN: 0.2

## 2019-03-26 PROCEDURE — 81002 URINALYSIS NONAUTO W/O SCOPE: CPT | Performed by: FAMILY MEDICINE

## 2019-03-26 PROCEDURE — 99213 OFFICE O/P EST LOW 20 MIN: CPT | Performed by: FAMILY MEDICINE

## 2019-03-26 NOTE — PROGRESS NOTES
Roberta Perez 1995 female MRN: [id number]    Acute Visit    SUBJECTIVE    CC: Dysmenorrhea (pelvic cramping/pressure); Urinary Urgency; and Vaginal Bleeding (spotting after intercourse )      HPI:  Roberta Perez is a 26 y o  female who presented for an acute visit complaining of spotting  Pt reports this has been going on for the past 3 weeks  It occurs mostly after intercourse  She reports she is with the same partner for the past 5 years  It has occurred once within the past 3 weeks unprovoked  Spotting is associated with lower abdominal pressure and some heaviness  Pt reports periods have been irregular on Depo and she experiences spotting for about 3-6 days around the time her next shot is due  Denies vaginal discharge  Recent pap last month showed ASCUS  She is on Depo shots for contraception  She has been on Depo for the past 5 years  Her last Depo shot was last month  Pt also reports having noticed more acne on her face within the past month  Denies dyspareunia but some discomfort with intercourse  Denies Douching or use of sex toys  Denies dysuria, fever, chills, n/v  Denies constipation  HPI     Review of Systems   Constitutional: Negative for chills and fever  HENT: Negative for congestion  Eyes: Negative for visual disturbance  Respiratory: Negative for choking and shortness of breath  Cardiovascular: Negative for chest pain and palpitations  Gastrointestinal: Negative for abdominal pain, blood in stool, diarrhea, nausea and vomiting  Genitourinary: Positive for menstrual problem (Irregular periods on the Depo)  Negative for dysuria, flank pain and vaginal discharge  Dyspareunia: Discomfort  Vaginal bleeding: vaginal spotting  Neurological: Negative for headaches         Historical Information   The patient history was reviewed as follows:  Past Medical History:   Diagnosis Date    Asthma      Past Surgical History:   Procedure Laterality Date    CHOLECYSTECTOMY      lap diego 2017    HERNIA REPAIR  2016    hernia x3, laparoscopic    WISDOM TOOTH EXTRACTION       Family History   Problem Relation Age of Onset    No Known Problems Mother     No Known Problems Father     No Known Problems Sister     No Known Problems Brother       Social History   Social History     Substance and Sexual Activity   Alcohol Use No     Social History     Substance and Sexual Activity   Drug Use No     Social History     Tobacco Use   Smoking Status Former Smoker    Packs/day: 0 00    Types: Cigarettes    Last attempt to quit: 2018    Years since quittin 8   Smokeless Tobacco Never Used   Tobacco Comment    Quit smoking a while ago, but recently started lightly smoking 1 month ago       Medications:   Meds/Allergies   Current Outpatient Medications   Medication Sig Dispense Refill    albuterol (VENTOLIN HFA) 90 mcg/act inhaler Inhale 1-2 puffs      medroxyPROGESTERone (DEPO-PROVERA) 150 mg/mL injection Inject 1 mL (150 mg total) into a muscle every 3 (three) months 1 mL 3     No current facility-administered medications for this visit  Allergies   Allergen Reactions    Penicillins Other (See Comments)     Pt states "I have not taken them since I was very young and am not sure what happens  Probably hives "    Cat Hair Extract     Dog Epithelium     Isoflavones     Nuts     Other      Annotation - [id number]: carrots    Pollen Extract     Wheat Bran     Amoxicillin Hives    Azithromycin Hives    Vicodin [Hydrocodone-Acetaminophen] Hives       OBJECTIVE    Vitals:   Vitals:    19 1547   BP: 119/81   BP Location: Left arm   Patient Position: Sitting   Cuff Size: Adult   Pulse: 89   Weight: 82 1 kg (181 lb)   Height: 5' 3" (1 6 m)       Physical Exam   Constitutional: She appears well-developed and well-nourished  HENT:   Head: Normocephalic and atraumatic  Facial acne   Eyes: Conjunctivae are normal    Neck: Normal range of motion     Cardiovascular: Normal rate, regular rhythm, normal heart sounds and intact distal pulses  Pulmonary/Chest: Effort normal and breath sounds normal    Abdominal: Soft  Bowel sounds are normal  There is no tenderness  Genitourinary: Uterus normal  Cervix exhibits no motion tenderness and no friability  Right adnexum displays no mass and no tenderness  Left adnexum displays no mass and no tenderness  No erythema, tenderness or bleeding in the vagina  No foreign body in the vagina  No signs of injury around the vagina  Vaginal discharge: minimal discharge  Musculoskeletal: Normal range of motion  Neurological: She is alert  Skin: Skin is warm and dry  Vitals reviewed  Lab:  I have personally reviewed all pertinent results     Office Visit on 03/26/2019   Component Date Value Ref Range Status    LEUKOCYTE ESTERASE,UA 03/26/2019 Negative   Final    NITRITE,UA 03/26/2019 Negative   Final    SL AMB POCT UROBILINOGEN 03/26/2019 0 2   Final    POCT URINE PROTEIN 03/26/2019 Negative   Final     PH,UA 03/26/2019 7 5   Final    BLOOD,UA 03/26/2019 Trace   Final    SPECIFIC GRAVITY,UA 03/26/2019 1 010   Final    KETONES,UA 03/26/2019 Negative   Final    BILIRUBIN,UA 03/26/2019 Negative   Final    GLUCOSE, UA 03/26/2019 Negative   Final     COLOR,UA 03/26/2019 Yellow   Final    CLARITY,UA 03/26/2019 Clear   Final   Office Visit on 02/19/2019   Component Date Value Ref Range Status    URINE HCG 02/19/2019 urine   Final    Case Report 02/19/2019    Final                    Value:Gynecologic Cytology Report                       Case: [id number]                                  Authorizing Provider:  OMID Mari   Collected:           02/19/2019 1022              Ordering Location:     [facility]      Received:            02/19/2019 [address] [place] [address] Screen:          MARCELINO Colvin Pathologist:           Vi Borges DO                                                            Specimen:    LIQUID-BASED PAP, SCREENING, Cervix                                                        Primary Interpretation 02/19/2019 Epithelial cell abnormality   Final    Interpretation 02/19/2019 Atypical squamous cells of undetermined significance   Final    Specimen Adequacy 02/19/2019 Satisfactory for evaluation  Endocervical/transformation zone component present  Final    Note 02/19/2019    Final                    Value: This result contains rich text formatting which cannot be displayed here   Additional Information 02/19/2019    Final                    Value: This result contains rich text formatting which cannot be displayed here   N gonorrhoeae, DNA Probe 02/19/2019 Negative  Negative Final    Chlamydia trachomatis, DNA Probe 02/19/2019 Negative  Negative Final       Imaging:  I have personally reviewed all pertinent results  EKG, Pathology, and Other Studies:   I have personally reviewed all pertinent results  Assessment/Plan   Vaginal spotting  Patient reports 3 episodes of vaginal spotting, onset in the past 3 weeks  Provoked by intercourse  Had one unprovoked episode  Reports discomfort with intercourse  On Depo for contraception, last depo shot was a month ago  Denies use of sex toys or douching  Hx of ASCUS last month on Pap  Spotting may be hormone related due to Depo  - Reassured  - Pt to follow up in 1 month  - may consider uterine biopsy if spotting persists  - Pt agrees to the plan  Gavi Peña was seen today for dysmenorrhea, urinary urgency and vaginal bleeding      Diagnoses and all orders for this visit:    Pelvic pain  -     POCT urine dip    Vaginal spotting            - PCP: Karyna Villalobos MD  - Follow-up appointments: [facility]    Future Appointments   Date Time Provider Zuleima Jane   4/23/2019  3:00 PM OBGYN RESIDENT [facility] Nirav Bonner 19   5/7/2019  4:30  [facility] Nirav Bonner 19      _____________________________________________________________________         Jannyedda Alba MD, PGY-1  [facility]   3/26/2019        Please be aware that this note contains text that was dictated and there may be errors pertaining to "sound-alike "words during the dictation process

## 2019-03-26 NOTE — ASSESSMENT & PLAN NOTE
Patient reports 3 episodes of vaginal spotting, onset in the past 3 weeks  Provoked by intercourse  Had one unprovoked episode  Reports discomfort with intercourse  On Depo for contraception, last depo shot was a month ago  Denies use of sex toys or douching  Hx of ASCUS last month on Pap  Spotting may be hormone related due to Depo  - Reassured  - Pt to follow up in 1 month  - may consider uterine biopsy if spotting persists  - Pt agrees to the plan

## 2019-04-23 ENCOUNTER — TELEPHONE (OUTPATIENT)
Dept: OBGYN CLINIC | Facility: CLINIC | Age: 24
End: 2019-04-23

## 2020-05-26 ENCOUNTER — TELEPHONE (OUTPATIENT)
Dept: OBGYN CLINIC | Facility: CLINIC | Age: 25
End: 2020-05-26

## 2020-05-26 ENCOUNTER — OFFICE VISIT (OUTPATIENT)
Dept: OBGYN CLINIC | Facility: CLINIC | Age: 25
End: 2020-05-26

## 2020-05-26 VITALS
SYSTOLIC BLOOD PRESSURE: 103 MMHG | WEIGHT: 148 LBS | HEIGHT: 63 IN | DIASTOLIC BLOOD PRESSURE: 67 MMHG | BODY MASS INDEX: 26.22 KG/M2 | TEMPERATURE: 98.6 F | HEART RATE: 80 BPM

## 2020-05-26 DIAGNOSIS — B96.89 BACTERIAL VAGINOSIS: Primary | ICD-10-CM

## 2020-05-26 DIAGNOSIS — N76.0 BACTERIAL VAGINOSIS: Primary | ICD-10-CM

## 2020-05-26 DIAGNOSIS — Z11.3 SCREEN FOR STD (SEXUALLY TRANSMITTED DISEASE): ICD-10-CM

## 2020-05-26 DIAGNOSIS — N89.8 VAGINAL DISCHARGE: ICD-10-CM

## 2020-05-26 DIAGNOSIS — R35.0 URINARY FREQUENCY: ICD-10-CM

## 2020-05-26 LAB
BV WHIFF TEST VAG QL: ABNORMAL
CLUE CELLS SPEC QL WET PREP: ABNORMAL
PH SMN: 5 [PH]
SL AMB  POCT GLUCOSE, UA: NEGATIVE
SL AMB LEUKOCYTE ESTERASE,UA: NEGATIVE
SL AMB POCT BILIRUBIN,UA: NEGATIVE
SL AMB POCT BLOOD,UA: ABNORMAL
SL AMB POCT CLARITY,UA: CLEAR
SL AMB POCT COLOR,UA: YELLOW
SL AMB POCT KETONES,UA: NEGATIVE
SL AMB POCT NITRITE,UA: NEGATIVE
SL AMB POCT PH,UA: 6
SL AMB POCT SPECIFIC GRAVITY,UA: 1.02
SL AMB POCT URINE PROTEIN: NEGATIVE
SL AMB POCT UROBILINOGEN: NEGATIVE
SL AMB POCT WET MOUNT: ABNORMAL
T VAGINALIS VAG QL WET PREP: ABNORMAL
YEAST VAG QL WET PREP: ABNORMAL

## 2020-05-26 PROCEDURE — 87210 SMEAR WET MOUNT SALINE/INK: CPT | Performed by: NURSE PRACTITIONER

## 2020-05-26 PROCEDURE — 81002 URINALYSIS NONAUTO W/O SCOPE: CPT | Performed by: NURSE PRACTITIONER

## 2020-05-26 PROCEDURE — 87086 URINE CULTURE/COLONY COUNT: CPT | Performed by: NURSE PRACTITIONER

## 2020-05-26 PROCEDURE — 87491 CHLMYD TRACH DNA AMP PROBE: CPT | Performed by: NURSE PRACTITIONER

## 2020-05-26 PROCEDURE — T1015 CLINIC SERVICE: HCPCS | Performed by: NURSE PRACTITIONER

## 2020-05-26 PROCEDURE — 87591 N.GONORRHOEAE DNA AMP PROB: CPT | Performed by: NURSE PRACTITIONER

## 2020-05-26 PROCEDURE — 99213 OFFICE O/P EST LOW 20 MIN: CPT | Performed by: NURSE PRACTITIONER

## 2020-05-26 RX ORDER — METRONIDAZOLE 500 MG/1
500 TABLET ORAL EVERY 12 HOURS SCHEDULED
Qty: 14 TABLET | Refills: 0 | Status: SHIPPED | OUTPATIENT
Start: 2020-05-26 | End: 2020-06-02

## 2020-05-27 LAB
BACTERIA UR CULT: NORMAL
C TRACH DNA SPEC QL NAA+PROBE: NEGATIVE
N GONORRHOEA DNA SPEC QL NAA+PROBE: NEGATIVE

## 2020-06-15 PROBLEM — B96.89 BACTERIAL VAGINOSIS: Status: RESOLVED | Noted: 2020-05-26 | Resolved: 2020-06-15

## 2020-06-15 PROBLEM — N93.9 VAGINAL SPOTTING: Status: RESOLVED | Noted: 2019-03-26 | Resolved: 2020-06-15

## 2020-06-15 PROBLEM — N76.0 BACTERIAL VAGINOSIS: Status: RESOLVED | Noted: 2020-05-26 | Resolved: 2020-06-15

## 2020-06-15 PROBLEM — R35.0 URINARY FREQUENCY: Status: RESOLVED | Noted: 2020-05-26 | Resolved: 2020-06-15

## 2020-06-15 PROBLEM — Z11.3 SCREEN FOR STD (SEXUALLY TRANSMITTED DISEASE): Status: RESOLVED | Noted: 2020-05-26 | Resolved: 2020-06-15

## 2020-06-15 PROBLEM — N89.8 VAGINAL DISCHARGE: Status: RESOLVED | Noted: 2020-05-26 | Resolved: 2020-06-15

## 2020-07-08 ENCOUNTER — TELEPHONE (OUTPATIENT)
Dept: OBGYN CLINIC | Facility: CLINIC | Age: 25
End: 2020-07-08

## 2020-07-16 NOTE — PROGRESS NOTES
ASSESSMENT & PLAN: Felicity Romeo is a 23 y o  [id number] with {NORMAL/ABNORMAL FJLI:63030} gynecologic exam     1   Routine well woman exam done today  2  Pap:  The patient's last pap was  2019  It was abnormal    It was ASCUS  Pap was done today  Current ASCCP Guidelines reviewed  3   STD testing  {DONE/NOT DONE:6814915045::"was not"} done ***  4   Gardasil recommendations reviewed *** {IS/ IS NOT:21966} vaccinated  5  The following were reviewed in today's visit: {Gyn counselin}  6  ***    CC:  Annual Gynecologic Examination    HPI: Felicity Romeo is a 23 y o  [id number] who presents for annual gynecologic examination  Patient was treated for BV on 2020,  Negative urine culture  She has the following concerns:  ***    Health Maintenance:    She wears her seatbelt routinely  She {DOES/DOES FYL:71074} perform {Desc; regular/irre} monthly self breast exams  She feels safe at home  Past Medical History:   Diagnosis Date    Asthma        Past Surgical History:   Procedure Laterality Date    CHOLECYSTECTOMY      lap diego 2017    HERNIA REPAIR  2016    hernia x3, laparoscopic    WISDOM TOOTH EXTRACTION         OB/Gyn History:    Pt {HAS/DOES NOT OQGC:79805} menstrual issues  ***    History of sexually transmitted infection: {YES/NO:20011}  History of abnormal pap smears: {YES/NO:94323} ***  Patient {IS/ IS NOT:96358} currently sexually active  The current method of family planning is {contraception:618607}      OB History        5    Para   2    Term   2       0    AB   3    Living   2       SAB   2    TAB   1    Ectopic   0    Multiple   0    Live Births   2                 Family History   Problem Relation Age of Onset    No Known Problems Mother     No Known Problems Father     No Known Problems Sister     No Known Problems Brother        Social History:  Social History     Socioeconomic History    Marital status: Single     Spouse name: Not on file    Number of children: Not on file    Years of education: Not on file    Highest education level: Not on file   Occupational History    Not on file   Social Needs    Financial resource strain: Not on file    Food insecurity:     Worry: Not on file     Inability: Not on file    Transportation needs:     Medical: Not on file     Non-medical: Not on file   Tobacco Use    Smoking status: Former Smoker     Packs/day: 0 00     Types: Cigarettes     Last attempt to quit: 2018     Years since quittin 1    Smokeless tobacco: Never Used    Tobacco comment: Quit smoking a while ago, but recently started lightly smoking 1 month ago   Substance and Sexual Activity    Alcohol use: No    Drug use: No    Sexual activity: Yes     Partners: Male     Birth control/protection: Injection     Comment: says her male partner has more than 1 sexual partner   Lifestyle    Physical activity:     Days per week: Not on file     Minutes per session: Not on file    Stress: Not on file   Relationships    Social connections:     Talks on phone: Not on file     Gets together: Not on file     Attends Yazdanism service: Not on file     Active member of club or organization: Not on file     Attends meetings of clubs or organizations: Not on file     Relationship status: Not on file    Intimate partner violence:     Fear of current or ex partner: Not on file     Emotionally abused: Not on file     Physically abused: Not on file     Forced sexual activity: Not on file   Other Topics Concern    Not on file   Social History Narrative    Not on file     Patient is {Desc; marital status:62}  Patient is currently {[facility] Employment:05804} ***    Allergies   Allergen Reactions    Penicillins Other (See Comments)     Pt states "I have not taken them since I was very young and am not sure what happens   Probably hives "    Cat Hair Extract     Dog Epithelium     Nuts     Other      Annotation - [id number]: carrots    Pollen Extract     Soy Isoflavones     Wheat Bran     Amoxicillin Hives    Azithromycin Hives    Vicodin [Hydrocodone-Acetaminophen] Hives         Current Outpatient Medications:     albuterol (VENTOLIN HFA) 90 mcg/act inhaler, Inhale 1-2 puffs, Disp: , Rfl:     Review of Systems:  Constitutional :no fever, feels well, no tiredness, no recent weight gain or loss  ENT: no ear ache, no loss of hearing, no nosebleeds or nasal discharge, no sore throat or hoarseness  Cardiovascular: no complaints of slow or fast heart beat, no chest pain, no palpitations, no leg claudication or lower extremity edema  Respiratory: no complaints of shortness of shortness of breath, no BOWEN  Breasts:no complaints of breast pain, breast lump, or nipple discharge  Gastrointestinal: no complaints of abdominal pain, constipation, nausea, vomiting, or diarrhea or bloody stools  Genitourinary : no complaints of dysuria, incontinence, pelvic pain, no dysmenorrhea, vaginal discharge or abnormal vaginal bleeding and as noted in HPI  Musculoskeletal: no complaints of arthralgia, no myalgia, no joint swelling or stiffness, no limb pain or swelling  Integumentary: no complaints of skin rash or lesion, itching or dry skin  Neurological: no complaints of headache, no confusion, no numbness or tingling, no dizziness or fainting    Objective      There were no vitals taken for this visit      General:   appears stated age, cooperative, alert normal mood and affect   Neck: normal, supple,trachea midline, no masses   Heart: regular rate and rhythm, S1, S2 normal, no murmur, click, rub or gallop   Lungs: clear to auscultation bilaterally   Breasts: {EXAM; BREAST:67834}   Abdomen: soft, non-tender, without masses or organomegaly   Vulva: {EXAM; GYN OAASY:41756}   Vagina: {exam; vagina:55975}   Urethra: {Urethra:57713}   Cervix: {PE Cervix:78309::"Normal, no discharge "}   Uterus: {exam; uterus:13048}   Adnexa: {exam; adnexa:45503}   Lymphatic palpation of lymph nodes in neck, axilla, groin and/or other locations: no lymphadenopathy or masses noted   Skin normal skin turgor and no rashes     Psychiatric orientation to person, place, and time: normal  mood and affect: normal

## 2020-07-17 ENCOUNTER — ANNUAL EXAM (OUTPATIENT)
Dept: OBGYN CLINIC | Facility: CLINIC | Age: 25
End: 2020-07-17

## 2020-07-17 VITALS
TEMPERATURE: 97.8 F | HEART RATE: 73 BPM | DIASTOLIC BLOOD PRESSURE: 77 MMHG | SYSTOLIC BLOOD PRESSURE: 125 MMHG | HEIGHT: 63 IN | BODY MASS INDEX: 26.19 KG/M2 | WEIGHT: 147.8 LBS

## 2020-07-17 DIAGNOSIS — Z20.2 POSSIBLE EXPOSURE TO STD: ICD-10-CM

## 2020-07-17 DIAGNOSIS — N76.0 BACTERIAL VAGINOSIS: Primary | ICD-10-CM

## 2020-07-17 DIAGNOSIS — B96.89 BACTERIAL VAGINOSIS: Primary | ICD-10-CM

## 2020-07-17 LAB
BV WHIFF TEST VAG QL: ABNORMAL
CLUE CELLS SPEC QL WET PREP: ABNORMAL
PH SMN: 5 [PH]
SL AMB POCT WET MOUNT: ABNORMAL
T VAGINALIS VAG QL WET PREP: ABNORMAL
YEAST VAG QL WET PREP: ABNORMAL

## 2020-07-17 PROCEDURE — 87491 CHLMYD TRACH DNA AMP PROBE: CPT | Performed by: NURSE PRACTITIONER

## 2020-07-17 PROCEDURE — 87210 SMEAR WET MOUNT SALINE/INK: CPT | Performed by: NURSE PRACTITIONER

## 2020-07-17 PROCEDURE — 99213 OFFICE O/P EST LOW 20 MIN: CPT | Performed by: NURSE PRACTITIONER

## 2020-07-17 PROCEDURE — 87591 N.GONORRHOEAE DNA AMP PROB: CPT | Performed by: NURSE PRACTITIONER

## 2020-07-17 RX ORDER — METRONIDAZOLE 7.5 MG/G
1 GEL VAGINAL DAILY
Qty: 70 G | Refills: 0 | Status: SHIPPED | OUTPATIENT
Start: 2020-07-17 | End: 2020-07-22

## 2020-07-17 NOTE — PATIENT INSTRUCTIONS
Covid - 19 instructions    If you are having any of the following     Cough   Shortness of breath   Fever  If traveled internationally or to high risk [place] states  Or been in contact with someone that has     Please call:    [phone]  option 7    They will screen you and direct you to the nearest testing location     Should not come to the PCP or OB office without calling that number first        Bacterial Vaginosis   WHAT YOU NEED TO KNOW:   Bacterial vaginosis (BV) is an infection in the vagina  It may cause vaginitis, which is irritation and inflammation of the vagina  DISCHARGE INSTRUCTIONS:   Medicines:   · Antibiotics: These are given to kill the bacteria that cause BV  They may be given as a pill or a cream to put in your vagina  Take or use as directed  · Take your medicine as directed  Contact your healthcare provider if you think your medicine is not helping or if you have side effects  Tell him of her if you are allergic to any medicine  Keep a list of the medicines, vitamins, and herbs you take  Include the amounts, and when and why you take them  Bring the list or the pill bottles to follow-up visits  Carry your medicine list with you in case of an emergency  Prevent bacterial vaginosis:   · Keep your vaginal area clean and dry:  Wear underwear and pantyhose with a cotton crotch  Wipe from front to back after you urinate or have a bowel movement  After bathing, rinse soap from your vaginal area to decrease your risk for irritation  · Do not use products that cause irritation:  Always use unscented tampons or sanitary pads  Do not use feminine sprays, powders, or scented tampons because they may cause irritation and increase your risk of BV  Detergents and fabric softeners may also cause irritation  · Do not douche: This can cause an imbalance in healthy vaginal bacteria  · Use latex condoms:   This helps prevent another infection and keeps your partner from getting the infection  Contact your healthcare provider if:   · Your symptoms come back or do not improve with treatment  · You have vaginal bleeding that is not your monthly period  · You have questions or concerns about your condition or care  © 2017 [facility] Information is for End User's use only and may not be sold, redistributed or otherwise used for commercial purposes  All illustrations and images included in CareNotes® are the copyrighted property of A D A M , Inc  or [employer]  The above information is an  only  It is not intended as medical advice for individual conditions or treatments  Talk to your doctor, nurse or pharmacist before following any medical regimen to see if it is safe and effective for you         return to office for annual gyn exam

## 2020-07-17 NOTE — PROGRESS NOTES
Assessment/Plan:    No problem-specific Assessment & Plan notes found for this encounter  Diagnoses and all orders for this visit:    Bacterial vaginosis  -     metroNIDAZOLE (METROGEL) 0 75 % vaginal gel; Insert 1 application into the vagina daily for 5 days  -     POCT wet mount   review to call if symptoms not improved   reviewed prevention   cyst return to office in 2 weeks for annual gyn exam  Possible exposure to STD  -     Chlamydia/GC amplified DNA by PCR; Future  -     Chlamydia/GC amplified DNA by PCR   will call results        Subjective:      Patient ID: Evan Moe is a 23 y o  female  HPI a 29-year-old  1, last sexually active with male, previously had 2 female partners but no longer in those relationships  Currently has Vaginal discharge that is thicker and white and has an odor,  She thinks is fishy  She has had BV in the past Denies any pelvic pain  Denies any fever chills  Consents to cultures for chlamydia gonorrhea  LMP 7/1/2020 x 5 days  Currently not on contraception declines  The following portions of the patient's history were reviewed and updated as appropriate: allergies, current medications, past family history, past medical history, past social history, past surgical history and problem list     Review of Systems   Constitutional: Negative for chills and fever  HENT: Negative for congestion  Respiratory: Negative for cough and shortness of breath  Cardiovascular: Negative  Genitourinary: Positive for vaginal discharge  Negative for dyspareunia, dysuria, genital sores, pelvic pain and urgency  Neurological: Negative for dizziness  Objective:      /77 (BP Location: Right arm, Patient Position: Sitting, Cuff Size: Standard)   Pulse 73   Temp 97 8 °F (36 6 °C) (Temporal)   Ht 5' 3" (1 6 m)   Wt 67 kg (147 lb 12 8 oz)   BMI 26 18 kg/m²          Physical Exam   Constitutional: She is oriented to person, place, and time   She appears well-nourished  Cardiovascular: Normal rate and regular rhythm  Pulmonary/Chest: Effort normal and breath sounds normal    Abdominal: Soft  There is no tenderness  Neurological: She is alert and oriented to person, place, and time  Skin: Skin is warm and dry        External genitalia- no lesions   vagina- moderate amount creamy white discharge   cervix- no lesions negative CMT   uterus- anteverted, nontender   adnexa- no masses nontender     Wet mount KOH- positive for BV, negative for yeast, negative for trich

## 2020-07-19 LAB
C TRACH DNA SPEC QL NAA+PROBE: NEGATIVE
N GONORRHOEA DNA SPEC QL NAA+PROBE: NEGATIVE

## 2020-07-20 ENCOUNTER — TELEPHONE (OUTPATIENT)
Dept: OBGYN CLINIC | Facility: CLINIC | Age: 25
End: 2020-07-20

## 2020-07-20 NOTE — TELEPHONE ENCOUNTER
----- Message from Katie Wiggins [address] sent at 7/19/2020 10:01 PM EDT -----  Result is negative, please call patient to inform    Thanks

## 2020-08-06 ENCOUNTER — TELEPHONE (OUTPATIENT)
Dept: OBGYN CLINIC | Facility: CLINIC | Age: 25
End: 2020-08-06

## 2020-10-12 ENCOUNTER — ANNUAL EXAM (OUTPATIENT)
Dept: OBGYN CLINIC | Facility: CLINIC | Age: 25
End: 2020-10-12

## 2020-10-12 DIAGNOSIS — Z01.419 ENCOUNTER FOR ANNUAL ROUTINE GYNECOLOGICAL EXAMINATION: Primary | ICD-10-CM

## 2020-10-12 DIAGNOSIS — Z72.51 HIGH RISK HETEROSEXUAL BEHAVIOR: ICD-10-CM

## 2020-10-12 PROCEDURE — 87491 CHLMYD TRACH DNA AMP PROBE: CPT | Performed by: NURSE PRACTITIONER

## 2020-10-12 PROCEDURE — G0145 SCR C/V CYTO,THINLAYER,RESCR: HCPCS | Performed by: NURSE PRACTITIONER

## 2020-10-12 PROCEDURE — 87591 N.GONORRHOEAE DNA AMP PROB: CPT | Performed by: NURSE PRACTITIONER

## 2020-10-12 PROCEDURE — 99395 PREV VISIT EST AGE 18-39: CPT | Performed by: NURSE PRACTITIONER

## 2020-10-13 ENCOUNTER — TELEPHONE (OUTPATIENT)
Dept: OBGYN CLINIC | Facility: CLINIC | Age: 25
End: 2020-10-13

## 2020-10-13 LAB
C TRACH DNA SPEC QL NAA+PROBE: NEGATIVE
N GONORRHOEA DNA SPEC QL NAA+PROBE: NEGATIVE

## 2020-10-20 ENCOUNTER — TELEPHONE (OUTPATIENT)
Dept: OBGYN CLINIC | Facility: CLINIC | Age: 25
End: 2020-10-20

## 2020-10-20 LAB
LAB AP GYN PRIMARY INTERPRETATION: NORMAL
Lab: NORMAL

## 2023-09-11 ENCOUNTER — ULTRASOUND (OUTPATIENT)
Dept: OBGYN CLINIC | Facility: MEDICAL CENTER | Age: 28
End: 2023-09-11
Payer: COMMERCIAL

## 2023-09-11 VITALS — BODY MASS INDEX: 29.62 KG/M2 | DIASTOLIC BLOOD PRESSURE: 72 MMHG | WEIGHT: 167.2 LBS | SYSTOLIC BLOOD PRESSURE: 136 MMHG

## 2023-09-11 DIAGNOSIS — N91.2 AMENORRHEA: Primary | ICD-10-CM

## 2023-09-11 PROCEDURE — 76817 TRANSVAGINAL US OBSTETRIC: CPT | Performed by: STUDENT IN AN ORGANIZED HEALTH CARE EDUCATION/TRAINING PROGRAM

## 2023-09-11 PROCEDURE — 99212 OFFICE O/P EST SF 10 MIN: CPT | Performed by: STUDENT IN AN ORGANIZED HEALTH CARE EDUCATION/TRAINING PROGRAM

## 2023-09-11 NOTE — PROGRESS NOTES
Patient here for early ultrasound. LMP: 6/20/23  GA: 79A5S  ADDIE: 3/26/24  [id number]  Unplanned Pregnancy  Regular Cycles  Patient has Nausea, vomiting, Breast Tenderness. Denies Bleeding, Cramping  Accompanied by a friend who is adopting the baby.

## 2023-09-14 ENCOUNTER — TELEPHONE (OUTPATIENT)
Facility: HOSPITAL | Age: 28
End: 2023-09-14

## 2023-09-14 NOTE — TELEPHONE ENCOUNTER
Called patient to schedule MFM appointment, based on referral issued to Maternal Fetal Medicine by [facility] office. Left voicemail requesting patient to call back and schedule appointment, with office number for return call [phone].

## 2023-09-20 ENCOUNTER — TELEPHONE (OUTPATIENT)
Facility: HOSPITAL | Age: 28
End: 2023-09-20

## 2023-09-20 NOTE — TELEPHONE ENCOUNTER
Called patient to schedule MFM appointment, based on referral issued to Maternal Fetal Medicine by [facility] office. Spoke with patient, patient prefers to call back to schedule. Universal Safety Interventions

## 2023-09-21 ENCOUNTER — INITIAL PRENATAL (OUTPATIENT)
Dept: OBGYN CLINIC | Facility: CLINIC | Age: 28
End: 2023-09-21

## 2023-09-21 VITALS — HEIGHT: 63 IN | BODY MASS INDEX: 29.62 KG/M2

## 2023-09-21 DIAGNOSIS — Z34.81 PRENATAL CARE, SUBSEQUENT PREGNANCY, FIRST TRIMESTER: Primary | ICD-10-CM

## 2023-09-21 PROCEDURE — OBC

## 2023-09-21 RX ORDER — DIPHENHYDRAMINE HCL 25 MG
25 TABLET ORAL EVERY 6 HOURS PRN
COMMUNITY

## 2023-09-21 RX ORDER — LORATADINE 10 MG/1
10 TABLET ORAL DAILY
COMMUNITY

## 2023-09-21 NOTE — PROGRESS NOTES
OB INTAKE INTERVIEW  Patient is 28 y.o.y.o. who presents for OB intake at 11-6 wks  She is accompanied by phone during this encounter  The father of her baby (pt does not wish to name FOB) is not  involved in the pregnancy . Y0S0 SAB2  4 elective terminations  Last Menstrual Period: 6/20/23  Ultrasound: Measured 11 weeks 6 days on 9/11/23  Estimated Date of Delivery: 3/26/24 via LMP & US        Signs/Symptoms of Pregnancy      Current pregnancy symptoms: nausea- vit B6, unisom protocol reviewed. Constipation no  Headaches no  Cramping/spotting no  PICA cravings no    Diabetes-    If patient has 1 or more, please order early 1 hour GTT  History of GDM YES  BMI >35 no  History of PCOS or current metformin use no  History of LGA/macrosomic infant (4000g/9lbs) no    If patient has 2 or more, please order early 1 hour GTT  BMI>30 no  AMA no  First degree relative with type 2 diabetes YES- pt's Dad  History of chronic HTN, hyperlipidemia, elevated A1C no  High risk race (, , ,  or ) YES    Hypertension- if you answer yes, please order preeclampsia labs (cbc, comprehensive metabolic panel, urine protein creatinine ratio, uric acid)  History of of chronic HTN no  History of gestational HTN no  History of preeclampsia, eclampsia, or HELLP syndrome no  History of diabetes no  History of lupus, autoimmune disease, kidney disease no    Thyroid- if yes order TSH with reflex T4  History of thyroid disease no    Bleeding Disorder or Hx of DVT-patient or first degree relative with history of. Order the following if not done previously.    (Factor V, antithrombin III, prothrombin gene mutation, protein C and S Ag, lupus anticoagulant, anticardiolipin, beta-2 glycoprotein)   no    OB/GYN-  History of abnormal pap smear YES       Date of last pap smear 10/20/2020  wnl  History of HPV no  History of Herpes/HSV no  History of other STI (gonorrhea, chlamydia, trich) YES-gonorrhea tx'd  History of prior  YES  History of prior  no  History of  delivery prior to 36 weeks 6 days no  History of blood transfusion no  Ok for blood transfusion yes    Substance screening- if yes outside of tobacco for her or anyone in her home-order urine drug screen  History of tobacco use YES  Currently using tobacco YES-occas use  Currently using alcohol no  Presently using drugs no  Past drug use  Yes- marijuana  IV drug use- no  Partner drug use YES- marijuana  Parent/Family drug use no    MRSA Screening-   Does the pt have a hx of MRSA? no  If yes- please follow MRSA protocol and obtain a nasal swab for MRSA culture    Immunizations:  Influenza vaccine given this season no  Discussed Tdap vaccine yes  Discussed COVID Vaccine yes-pt declined    Genetic/MFM-  Do you or your partner have a history of any of the following in yourselves or first degree relatives? Cystic fibrosis no  Spinal muscular atrophy no  Hemoglobinopathy/Sickle Cell/Thalassemia no  Fragile X Intellectual Disability no    If yes, discuss carrier screening and recommend consultation with MFM/genetic counseling. If no, discuss option for carrier screening and/or genetic testing with Nuchal Ultrasound. Patient interested ?   Appointment at [address] [place] [address] made no    Interview education  [facility] Pregnancy Essentials Book reviewed, discussed and attached to their AVS yes    Nurse/Family Partnership- patient may qualify yes; referral placed yes    Prenatal lab work scripts yes  Extra labs ordered:  One hour glucola, UDS    Aspirin/Preeclampsia Screen    Risk Level Risk Factor Recommendation   LOW Prior Uncomplicated full-term delivery YES No Aspirin recommendation        MODERATE Nulliparity no Recommend low-dose aspirin if     BMI>30 no 2 or more moderate risk factors    Family History Preeclampsia (mother/sister) no     35yr old or greater no      or Low Socioeconomic YES     IVF Pregnancy  no     Personal History Risks (low birth weight, prior adverse preg outcome, >10yr preg interval) no         HIGH History of Preeclampsia no Recommend low-dose aspirin if     Multifetal gestation no 1 or more high risk factors    Chronic HTN no     Type 1 or 2 Diabetes no     Renal Disease no     Autoimmune Disease  no      Contraindications to ASA therapy:  NSAID/ ASA allergy: no  Nasal polyps: no  Asthma with history of ASA induced bronchospasm: no  Relative contraindications:  History of GI bleed: no  Active peptic ulcer disease: no  Severe hepatic dysfunction: no    Patient should be recommended to take ASA 162mg during this pregnancy from 12-36wks to lower her risk of preeclampsia: no      The patient has a history now or in prior pregnancy notable for: SAB2  Elective termination 4,  Hx of 2 prior vag hai. Hx of GDM, Hx of THC use- "weaning off now.",  Pt currently in therapy for hx of depression. Trying to wear off THC & cigarettes. Details that I feel the provider should be aware of:  Pt plans on Adoption. Pt declined CF & SMA testing. PN1 visit scheduled. The patient was oriented to our practice, reviewed delivering physicians and V[facility]ibility for Delivery. All questions were answered. PN phone interview completed. Pt does not want to name FOB- not in a relationship with him. Plans on adopting baby out. PN bldwk ordered in epic, including a one hour glucose, & UDS. Encouraged pt to have bldwk drawn prior to PN1 visit, scheduled for 10/11/23. Referral was sent to [facility]- pt was offered an appt for seq screen, but not able to make appt. Advised pt to call with any further questions/concerns.          Interviewed by: Armando Caldwell RN, [address] [place] [address]

## 2023-10-11 PROBLEM — Z01.419 ENCOUNTER FOR ANNUAL ROUTINE GYNECOLOGICAL EXAMINATION: Status: RESOLVED | Noted: 2019-02-19 | Resolved: 2023-10-11

## 2023-10-11 PROBLEM — O09.292 H/O GESTATIONAL DIABETES IN PRIOR PREGNANCY, CURRENTLY PREGNANT, SECOND TRIMESTER: Status: ACTIVE | Noted: 2023-10-11

## 2023-10-11 PROBLEM — Z86.32 H/O GESTATIONAL DIABETES IN PRIOR PREGNANCY, CURRENTLY PREGNANT, SECOND TRIMESTER: Status: ACTIVE | Noted: 2023-10-11

## 2023-10-11 PROBLEM — Z11.3 ROUTINE SCREENING FOR STI (SEXUALLY TRANSMITTED INFECTION): Status: RESOLVED | Noted: 2019-02-19 | Resolved: 2023-10-11

## 2023-10-30 ENCOUNTER — TELEPHONE (OUTPATIENT)
Dept: OBGYN CLINIC | Facility: CLINIC | Age: 28
End: 2023-10-30

## 2023-10-30 NOTE — TELEPHONE ENCOUNTER
I am working on the overdue bin and this patent was a no show for her initial prenatal exam. She has no other appt's scheduled. She also has not completed her early prenatal labs.

## 2023-12-01 ENCOUNTER — ROUTINE PRENATAL (OUTPATIENT)
Dept: PERINATAL CARE | Facility: OTHER | Age: 28
End: 2023-12-01
Payer: COMMERCIAL

## 2023-12-01 VITALS
BODY MASS INDEX: 32.6 KG/M2 | SYSTOLIC BLOOD PRESSURE: 104 MMHG | HEIGHT: 63 IN | WEIGHT: 184 LBS | HEART RATE: 98 BPM | DIASTOLIC BLOOD PRESSURE: 70 MMHG

## 2023-12-01 DIAGNOSIS — Z36.86 ENCOUNTER FOR ANTENATAL SCREENING FOR CERVICAL LENGTH: ICD-10-CM

## 2023-12-01 DIAGNOSIS — Z36.3 ENCOUNTER FOR ANTENATAL SCREENING FOR MALFORMATION: ICD-10-CM

## 2023-12-01 DIAGNOSIS — Z3A.23 23 WEEKS GESTATION OF PREGNANCY: ICD-10-CM

## 2023-12-01 DIAGNOSIS — O09.292 H/O GESTATIONAL DIABETES IN PRIOR PREGNANCY, CURRENTLY PREGNANT, SECOND TRIMESTER: Primary | ICD-10-CM

## 2023-12-01 DIAGNOSIS — Z86.32 H/O GESTATIONAL DIABETES IN PRIOR PREGNANCY, CURRENTLY PREGNANT, SECOND TRIMESTER: Primary | ICD-10-CM

## 2023-12-01 PROBLEM — N91.2 AMENORRHEA: Status: RESOLVED | Noted: 2023-09-11 | Resolved: 2023-12-01

## 2023-12-01 PROBLEM — Z72.51 HIGH RISK HETEROSEXUAL BEHAVIOR: Status: RESOLVED | Noted: 2020-10-12 | Resolved: 2023-12-01

## 2023-12-01 PROCEDURE — 99243 OFF/OP CNSLTJ NEW/EST LOW 30: CPT | Performed by: OBSTETRICS & GYNECOLOGY

## 2023-12-01 PROCEDURE — 76817 TRANSVAGINAL US OBSTETRIC: CPT | Performed by: OBSTETRICS & GYNECOLOGY

## 2023-12-01 PROCEDURE — 76811 OB US DETAILED SNGL FETUS: CPT | Performed by: OBSTETRICS & GYNECOLOGY

## 2023-12-01 NOTE — LETTER
December 1, 2023     Mirella Dear, [address] [address]    Patient: Ladonna Chan   YOB: 1995   Date of Visit: 12/1/2023       Dear Dr. Carmela Mckenzie:    Thank you for referring Ladonna Chan to me for evaluation. Below are my notes for this consultation. If you have questions, please do not hesitate to call me. I look forward to following your patient along with you. Sincerely,        Shayna Ardon MD        CC: No Recipients    Shayna Ardon MD  12/1/2023  9:00 PM  Incomplete  Ladonna Chan  has no complaints today at 23w3d. She reports fetal movements and does not report any vaginal bleeding or signs of labor. She is late to prenatal care and has not had any genetic screening yet. She is here today for an ultrasound for fetal anatomy. Problem list:  History of gestational diabetes A1 in her first pregnancy but not her second. Ramsey Duverney is here today with her daughter [place] and her Sister Jessie Draper. She has allergies to amoxicillin, azithromycin, Vicodin and penicillin that causes hives . She currently is only on prenatal vitamins. Patient reports that in this pregnancy she has noticed some back and hip pain that is common in pregnancy but does not feel that she needs physical therapy at this point. Surgical history includes umbilical hernia repair that will need to be repaired after this pregnancy again. She also has a history of depression. She reports having 2 term pregnancies without complications in 2811. She has a living daughter and son. She also has 4 terminations and 2 miscarriages. She is late to prenatal care with this pregnancy because she was on not sure if she wanted to continue the pregnancy. Ultrasound findings: The ultrasound today shows normal interval fetal growth and fluid, normal cervical length, and no malformations were detected.     Pregnancy ultrasound has limitations and is unable to detect all forms of fetal congenital abnormalities. Specific counseling was provided on the following problems:  Recommend diabetes screening through her OB office. Offered genetic screening with the use of cell free DNA testing which she would be interested in but she will have to return on a different day when she has more time. Follow up recommended:   If no sign of diabetes in this pregnancy then no further follow-up ultrasound recommended. Pre visit time reviewing her records   5 minutes  Face to face time 10 minutes  Post visit time on documentation of note, updating her problem list, adding orders and prescriptions 10 minutes. Procedures that were completed today were charged separately. The level of decision making was low level complexity. MD Luh Cortes  12/1/2023  8:12 AM  Sign when Signing Visit  Ultrasound Probe Disinfection    A transvaginal ultrasound was performed. Prior to use, disinfection was performed with High Level Disinfection Process ([employer]). Probe serial number M2: [id number] was used.       Ade Harding  12/01/23  8:12 AM

## 2023-12-01 NOTE — PROGRESS NOTES
Ultrasound Probe Disinfection    A transvaginal ultrasound was performed. Prior to use, disinfection was performed with High Level Disinfection Process ([employer]). Probe serial number M2: [id number] was used.       Ade Harding  12/01/23  8:12 AM

## 2023-12-01 NOTE — PROGRESS NOTES
David Scales  has no complaints today at 23w3d. She reports fetal movements and does not report any vaginal bleeding or signs of labor. She is late to prenatal care and has not had any genetic screening yet. She is here today for an ultrasound for fetal anatomy. Problem list:  History of gestational diabetes A1 in her first pregnancy but not her second. Verenice Cesar is here today with her daughter [place] and her Sister Calli Millard. She has allergies to amoxicillin, azithromycin, Vicodin and penicillin that causes hives . She currently is only on prenatal vitamins. Patient reports that in this pregnancy she has noticed some back and hip pain that is common in pregnancy but does not feel that she needs physical therapy at this point. Surgical history includes umbilical hernia repair that will need to be repaired after this pregnancy again. She also has a history of depression. She reports having 2 term pregnancies without complications in 1212. She has a living daughter and son. She also has 4 terminations and 2 miscarriages. She is late to prenatal care with this pregnancy because she was on not sure if she wanted to continue the pregnancy. Ultrasound findings: The ultrasound today shows normal interval fetal growth and fluid, normal cervical length, and no malformations were detected. Pregnancy ultrasound has limitations and is unable to detect all forms of fetal congenital abnormalities. Specific counseling was provided on the following problems:  Recommend diabetes screening through her OB office. Offered genetic screening with the use of cell free DNA testing which she would be interested in but she will have to return on a different day when she has more time. Follow up recommended:   If no sign of diabetes in this pregnancy then no further follow-up ultrasound recommended.     Pre visit time reviewing her records   5 minutes  Face to face time 10 minutes  Post visit time on documentation of note, updating her problem list, adding orders and prescriptions 10 minutes. Procedures that were completed today were charged separately. The level of decision making was low level complexity.     Juan Manuel Hubbard MD

## 2024-01-16 ENCOUNTER — TELEPHONE (OUTPATIENT)
Dept: PERINATAL CARE | Facility: OTHER | Age: 29
End: 2024-01-16

## 2024-01-16 NOTE — TELEPHONE ENCOUNTER
"Patient called Alaina 1/16 at 1320 stating she needed to make a ultrasound appt. Stated, \"the last time I was seen I was told that I did not need anymore ultrasounds and I feel that is strange.\" After looking over the patients chart it was noted on 12/1/2023, \"If no sign of diabetes in this pregnancy then no further follow-up ultrasound recommended.\" Patient had no OB appts since 9/21/2023 so she was referred to her OB for further recommendations. I did advise Charli of this patients call with which a call would be made to patient.   "

## 2024-01-23 PROBLEM — O09.293 H/O GESTATIONAL DIABETES IN PRIOR PREGNANCY, CURRENTLY PREGNANT, THIRD TRIMESTER: Status: ACTIVE | Noted: 2023-10-11

## 2024-01-23 PROBLEM — Z3A.31 31 WEEKS GESTATION OF PREGNANCY: Status: ACTIVE | Noted: 2024-01-23

## 2024-01-23 NOTE — PATIENT INSTRUCTIONS
Pregnancy at 31 to 34 Weeks   WHAT YOU NEED TO KNOW:   What changes are happening with my body?  You may continue to have symptoms such as shortness of breath, heartburn, contractions, or swelling of your ankles and feet. You may be gaining about 1 pound a week now.   How do I care for myself at this stage of my pregnancy?       Eat a variety of healthy foods.  Healthy foods include fruits, vegetables, whole-grain breads, low-fat dairy foods, beans, lean meats, and fish. Drink liquids as directed. Ask how much liquid to drink each day and which liquids are best for you. Limit caffeine to less than 200 milligrams each day. Limit your intake of fish to 2 servings each week. Choose fish low in mercury such as canned light tuna, shrimp, salmon, cod, or tilapia. Do not  eat fish high in mercury such as swordfish, tilefish, bernadine mackerel, and shark.         Manage heartburn  by eating 4 or 5 small meals each day instead of large meals. Avoid spicy food.    Manage swelling  by lying down and putting your feet up.         Take prenatal vitamins as directed.  Your need for certain vitamins and minerals, such as folic acid, increases during pregnancy. Prenatal vitamins provide some of the extra vitamins and minerals you need. Prenatal vitamins may also help to decrease the risk of certain birth defects.         Talk to your healthcare provider about exercise.  Moderate exercise can help you stay fit. Your healthcare provider will help you plan an exercise program that is safe for you during pregnancy.         Do not smoke.  Smoking increases your risk of a miscarriage and other health problems during your pregnancy. Smoking can cause your baby to be born too early or weigh less at birth. Ask your healthcare provider for information if you need help quitting.    Do not drink alcohol.  Alcohol passes from your body to your baby through the placenta. It can affect your baby's brain development and cause fetal alcohol syndrome  (FAS). FAS is a group of conditions that causes mental, behavior, and growth problems.    Talk to your healthcare provider before you take any medicines.  Many medicines may harm your baby if you take them when you are pregnant. Do not take any medicines, vitamins, herbs, or supplements without first talking to your healthcare provider. Never use illegal or street drugs (such as marijuana or cocaine) while you are pregnant.  What are some safety tips during pregnancy?   Avoid hot tubs and saunas.  Do not use a hot tub or sauna while you are pregnant, especially during your first trimester. Hot tubs and saunas may raise your baby's temperature and increase the risk of birth defects.    Avoid toxoplasmosis.  This is an infection caused by eating raw meat or being around infected cat feces. It can cause birth defects, miscarriages, and other problems. Wash your hands after you touch raw meat. Make sure any meat is well-cooked before you eat it. Avoid raw eggs and unpasteurized milk. Use gloves or ask someone else to clean your cat's litter box while you are pregnant.       What changes are happening with my baby?  By 34 weeks, your baby may weigh more than 5 pounds. Your baby will be about 12 ½ inches long from the top of the head to the rump (baby's bottom). Your baby is gaining about ½ pound a week. Your baby's eyes open and close now. Your baby's kicks and movements are more forceful at this time.  What do I need to know about prenatal care?  Your healthcare provider will check your blood pressure and weight. You may also need the following:  A urine test  may also be done to check for sugar and protein. These can be signs of gestational diabetes or infection. Protein in your urine may also be a sign of preeclampsia. Preeclampsia is a condition that can develop during week 20 or later of your pregnancy. It causes high blood pressure, and it can cause problems with your kidneys and other organs.    A gestational  diabetes screen  may be done. Your healthcare provider may order either a 1-step or 2-step oral glucose tolerance test (OGTT).     1-step OGTT:  Your blood sugar level will be tested after you have not eaten for 8 hours (fasting). You will then be given a glucose drink. Your level will be tested again 1 hour and 2 hours after you finish the drink.    2-step OGTT:  You do not have to fast for the first part of the test. You will have the glucose drink at any time of day. Your blood sugar level will be checked 1 hour later. If your blood sugar is higher than a certain level, another test will be ordered. You will fast and your blood sugar level will be tested. You will have the glucose drink. Your blood will be tested again 1 hour, 2 hours, and 3 hours after you finish the glucose drink.    A Tdap vaccine  may be recommended by your healthcare provider.    Fundal height  is a measurement of your uterus to check your baby's growth. This number is usually the same as the number of weeks that you have been pregnant. Your healthcare provider may also check your baby's position.    Your baby's heart rate  will be checked.    When should I seek immediate care?   You develop a severe headache that does not go away.    You have new or increased vision changes, such as blurred or spotted vision.    You have new or increased swelling in your face or hands.    You have vaginal spotting or bleeding.    Your water broke or you feel warm water gushing or trickling from your vagina.    When should I call my obstetrician?   You have more than 5 contractions in 1 hour.    You notice any changes in your baby's movements.    You have abdominal cramps, pressure, or tightening.    You have a change in vaginal discharge.    You have chills or a fever.    You have vaginal itching, burning, or pain.    You have yellow, green, white, or foul-smelling vaginal discharge.    You have pain or burning when you urinate, less urine than usual, or  pink or bloody urine.    You have questions or concerns about your condition or care.    CARE AGREEMENT:   You have the right to help plan your care. Learn about your health condition and how it may be treated. Discuss treatment options with your healthcare providers to decide what care you want to receive. You always have the right to refuse treatment. The above information is an  only. It is not intended as medical advice for individual conditions or treatments. Talk to your doctor, nurse or pharmacist before following any medical regimen to see if it is safe and effective for you.  © Copyright [employer] 2023 Information is for End User's use only and may not be sold, redistributed or otherwise used for commercial purposes.

## 2024-01-25 ENCOUNTER — TELEPHONE (OUTPATIENT)
Age: 29
End: 2024-01-25

## 2024-01-25 ENCOUNTER — INITIAL PRENATAL (OUTPATIENT)
Age: 29
End: 2024-01-25
Payer: COMMERCIAL

## 2024-01-25 VITALS — DIASTOLIC BLOOD PRESSURE: 68 MMHG | WEIGHT: 202 LBS | SYSTOLIC BLOOD PRESSURE: 110 MMHG | BODY MASS INDEX: 35.78 KG/M2

## 2024-01-25 DIAGNOSIS — Z3A.31 31 WEEKS GESTATION OF PREGNANCY: ICD-10-CM

## 2024-01-25 DIAGNOSIS — O99.333 MATERNAL TOBACCO USE IN THIRD TRIMESTER: ICD-10-CM

## 2024-01-25 DIAGNOSIS — Z86.32 H/O GESTATIONAL DIABETES IN PRIOR PREGNANCY, CURRENTLY PREGNANT, THIRD TRIMESTER: ICD-10-CM

## 2024-01-25 DIAGNOSIS — O09.30 LIMITED PRENATAL CARE, ANTEPARTUM: ICD-10-CM

## 2024-01-25 DIAGNOSIS — Z34.93 PRENATAL CARE, THIRD TRIMESTER: Primary | ICD-10-CM

## 2024-01-25 DIAGNOSIS — O09.293 H/O GESTATIONAL DIABETES IN PRIOR PREGNANCY, CURRENTLY PREGNANT, THIRD TRIMESTER: ICD-10-CM

## 2024-01-25 DIAGNOSIS — O26.843 UTERINE SIZE-DATE DISCREPANCY IN THIRD TRIMESTER: ICD-10-CM

## 2024-01-25 DIAGNOSIS — J11.1 INFLUENZA: ICD-10-CM

## 2024-01-25 DIAGNOSIS — Z23 NEED FOR TDAP VACCINATION: ICD-10-CM

## 2024-01-25 LAB
SL AMB  POCT GLUCOSE, UA: NEGATIVE
SL AMB POCT URINE PROTEIN: NEGATIVE

## 2024-01-25 PROCEDURE — 87491 CHLMYD TRACH DNA AMP PROBE: CPT | Performed by: NURSE PRACTITIONER

## 2024-01-25 PROCEDURE — 87591 N.GONORRHOEAE DNA AMP PROB: CPT | Performed by: NURSE PRACTITIONER

## 2024-01-25 PROCEDURE — 99214 OFFICE O/P EST MOD 30 MIN: CPT | Performed by: NURSE PRACTITIONER

## 2024-01-25 PROCEDURE — 81002 URINALYSIS NONAUTO W/O SCOPE: CPT | Performed by: NURSE PRACTITIONER

## 2024-01-25 PROCEDURE — G0145 SCR C/V CYTO,THINLAYER,RESCR: HCPCS | Performed by: NURSE PRACTITIONER

## 2024-01-25 NOTE — TELEPHONE ENCOUNTER
Patient came into office 01/25/2024  for PN1 appointment  after multiple No Shows, declined to make more appointments at check out, stated she would call

## 2024-01-25 NOTE — ASSESSMENT & PLAN NOTE
Here for 1st OB appointment accompanied by no one. This is her first prenatal visit since her OB interview 9/11. Lives in [place]. Feels ok, getting over a cold. Pt states she smokes cigarettes occasionally and has not used medical Marijuana since October. Pap/GC/CT done today. Hx of GDM in previous pregnancy and measuring larger than dates today. She was advised to get her 1*GTT asap and call for an MFM appt. Denies LOF/CTX/VB. See overview for plan of care. All questions answered. Oriented to our practice. Blue and yellow folders given and reviewed. Pt declined to make further OB appts today but agrees to call MFM. She plans to have her tooth pulled and a dental letter was provided today.

## 2024-01-26 ENCOUNTER — TELEPHONE (OUTPATIENT)
Dept: PERINATAL CARE | Facility: OTHER | Age: 29
End: 2024-01-26

## 2024-01-28 LAB
C TRACH DNA SPEC QL NAA+PROBE: NEGATIVE
N GONORRHOEA DNA SPEC QL NAA+PROBE: NEGATIVE

## 2024-01-29 ENCOUNTER — TELEPHONE (OUTPATIENT)
Facility: HOSPITAL | Age: 29
End: 2024-01-29

## 2024-01-29 NOTE — TELEPHONE ENCOUNTER
Left patient a message that her MFM appointment had to be rescheduled.  The new time, date and location were provided.  The patient has been instructed to please call us back at [phone] with any questions or concerns.

## 2024-01-30 LAB
LAB AP GYN PRIMARY INTERPRETATION: NORMAL
LAB AP LMP: NORMAL
Lab: NORMAL

## 2024-01-31 ENCOUNTER — TELEPHONE (OUTPATIENT)
Facility: HOSPITAL | Age: 29
End: 2024-01-31

## 2024-01-31 NOTE — TELEPHONE ENCOUNTER
Received voicemail from patient regarding the time change to her appointment with MFM on 2/2. Patient states that she is unavailable at that time. Attempted to reschedule patient. Offered several appointment options for the patient (2/5 at 2pm in [place], 2/6 at 11:30 in [facility], or [place] and 2pm in [place], etc.). Patient states that she will call back to reschedule when she has her work schedule and requested that her appointment for 2/2 be canceled. Requested patient call back at [phone] to reschedule or if she has any questions.

## 2024-02-21 ENCOUNTER — ULTRASOUND (OUTPATIENT)
Dept: PERINATAL CARE | Facility: CLINIC | Age: 29
End: 2024-02-21
Payer: COMMERCIAL

## 2024-02-21 VITALS
DIASTOLIC BLOOD PRESSURE: 60 MMHG | WEIGHT: 208.4 LBS | SYSTOLIC BLOOD PRESSURE: 122 MMHG | BODY MASS INDEX: 36.93 KG/M2 | HEART RATE: 77 BPM | HEIGHT: 63 IN

## 2024-02-21 DIAGNOSIS — O09.293 H/O GESTATIONAL DIABETES IN PRIOR PREGNANCY, CURRENTLY PREGNANT, THIRD TRIMESTER: ICD-10-CM

## 2024-02-21 DIAGNOSIS — Z86.32 H/O GESTATIONAL DIABETES IN PRIOR PREGNANCY, CURRENTLY PREGNANT, THIRD TRIMESTER: ICD-10-CM

## 2024-02-21 DIAGNOSIS — O99.333 MATERNAL TOBACCO USE IN THIRD TRIMESTER: Primary | ICD-10-CM

## 2024-02-21 DIAGNOSIS — O09.33 PRENATAL CARE INSUFFICIENT, THIRD TRIMESTER: ICD-10-CM

## 2024-02-21 DIAGNOSIS — O26.843 UTERINE SIZE-DATE DISCREPANCY IN THIRD TRIMESTER: ICD-10-CM

## 2024-02-21 DIAGNOSIS — O40.3XX0 POLYHYDRAMNIOS AFFECTING PREGNANCY IN THIRD TRIMESTER: ICD-10-CM

## 2024-02-21 DIAGNOSIS — E66.01 SEVERE OBESITY DUE TO EXCESS CALORIES AFFECTING PREGNANCY IN THIRD TRIMESTER (HCC): ICD-10-CM

## 2024-02-21 DIAGNOSIS — Z3A.35 35 WEEKS GESTATION OF PREGNANCY: ICD-10-CM

## 2024-02-21 DIAGNOSIS — O99.213 SEVERE OBESITY DUE TO EXCESS CALORIES AFFECTING PREGNANCY IN THIRD TRIMESTER (HCC): ICD-10-CM

## 2024-02-21 PROCEDURE — 76819 FETAL BIOPHYS PROFIL W/O NST: CPT | Performed by: OBSTETRICS & GYNECOLOGY

## 2024-02-21 PROCEDURE — NC001 PR NO CHARGE: Performed by: OBSTETRICS & GYNECOLOGY

## 2024-02-21 PROCEDURE — 76816 OB US FOLLOW-UP PER FETUS: CPT | Performed by: OBSTETRICS & GYNECOLOGY

## 2024-02-21 PROCEDURE — 99214 OFFICE O/P EST MOD 30 MIN: CPT | Performed by: OBSTETRICS & GYNECOLOGY

## 2024-02-21 NOTE — PROGRESS NOTES
A fetal ultrasound was completed. See Ob procedures in Epic for an interpretation and recommendations. Do not hesitate to contact us in [facility] if you have questions.    Austin Bennett MD, MSCE  Maternal Fetal Medicine

## 2024-02-21 NOTE — LETTER
February 21, 2024     Mehnaz Durbin MD  [address]  [address]  [place] [facility] [address]    Patient: Deysi Bauer   YOB: 1995   Date of Visit: 2/21/2024       Dear Dr. Jann Durbin:    Thank you for referring Deysi Bauer to me for evaluation. Below are my notes for this consultation.    If you have questions, please do not hesitate to call me. I look forward to following your patient along with you.         Sincerely,        Austin Bennett MD        CC: No Recipients    Austin Bennett MD  2/21/2024  3:00 PM  Sign when Signing Visit  A fetal ultrasound was completed. See Ob procedures in Epic for an interpretation and recommendations. Do not hesitate to contact us in [facility] if you have questions.    Austin Bennett MD, MSCE  Maternal Fetal Medicine

## 2024-02-21 NOTE — PROGRESS NOTES
Deysi arrived at [facility] today for ultrasound that is scheduled 2/22. Patient confirmed she did receive notification from [facility] office confirming 2/22 appointment.  Sonographer and Provider ( Dr. Bennett) in [place] office today added patient over lunch. Reviewed with patient that she has no pending appts with her OB and provided her phone number to call OB for appointment.

## 2024-02-22 ENCOUNTER — APPOINTMENT (OUTPATIENT)
Age: 29
End: 2024-02-22
Payer: COMMERCIAL

## 2024-02-22 ENCOUNTER — ROUTINE PRENATAL (OUTPATIENT)
Age: 29
End: 2024-02-22
Payer: COMMERCIAL

## 2024-02-22 VITALS
BODY MASS INDEX: 36.67 KG/M2 | WEIGHT: 207 LBS | SYSTOLIC BLOOD PRESSURE: 122 MMHG | DIASTOLIC BLOOD PRESSURE: 82 MMHG | HEART RATE: 100 BPM

## 2024-02-22 DIAGNOSIS — O09.33 PRENATAL CARE INSUFFICIENT, THIRD TRIMESTER: Primary | ICD-10-CM

## 2024-02-22 DIAGNOSIS — Z3A.35 35 WEEKS GESTATION OF PREGNANCY: ICD-10-CM

## 2024-02-22 DIAGNOSIS — O99.213 SEVERE OBESITY DUE TO EXCESS CALORIES AFFECTING PREGNANCY IN THIRD TRIMESTER (HCC): ICD-10-CM

## 2024-02-22 DIAGNOSIS — E66.01 SEVERE OBESITY DUE TO EXCESS CALORIES AFFECTING PREGNANCY IN THIRD TRIMESTER (HCC): ICD-10-CM

## 2024-02-22 DIAGNOSIS — Z34.93 PRENATAL CARE, THIRD TRIMESTER: ICD-10-CM

## 2024-02-22 DIAGNOSIS — O40.3XX0 POLYHYDRAMNIOS AFFECTING PREGNANCY IN THIRD TRIMESTER: ICD-10-CM

## 2024-02-22 DIAGNOSIS — Z34.81 PRENATAL CARE, SUBSEQUENT PREGNANCY, FIRST TRIMESTER: ICD-10-CM

## 2024-02-22 LAB
ABO GROUP BLD: NORMAL
BASOPHILS # BLD MANUAL: 0 THOUSAND/UL (ref 0–0.1)
BASOPHILS NFR MAR MANUAL: 0 % (ref 0–1)
BLD GP AB SCN SERPL QL: NEGATIVE
EOSINOPHIL # BLD MANUAL: 0.44 THOUSAND/UL (ref 0–0.4)
EOSINOPHIL NFR BLD MANUAL: 3 % (ref 0–6)
ERYTHROCYTE [DISTWIDTH] IN BLOOD BY AUTOMATED COUNT: 14 % (ref 11.6–15.1)
FERRITIN SERPL-MCNC: 10 NG/ML (ref 11–307)
GLUCOSE 1H P 50 G GLC PO SERPL-MCNC: 165 MG/DL (ref 40–134)
HBV SURFACE AG SER QL: NORMAL
HCT VFR BLD AUTO: 30.2 % (ref 34.8–46.1)
HGB BLD-MCNC: 9.9 G/DL (ref 11.5–15.4)
LYMPHOCYTES # BLD AUTO: 19 % (ref 14–44)
LYMPHOCYTES # BLD AUTO: 2.95 THOUSAND/UL (ref 0.6–4.47)
MCH RBC QN AUTO: 30.7 PG (ref 26.8–34.3)
MCHC RBC AUTO-ENTMCNC: 32.8 G/DL (ref 31.4–37.4)
MCV RBC AUTO: 94 FL (ref 82–98)
MONOCYTES # BLD AUTO: 0.44 THOUSAND/UL (ref 0–1.22)
MONOCYTES NFR BLD: 3 % (ref 4–12)
NEUTROPHILS # BLD MANUAL: 10.93 THOUSAND/UL (ref 1.85–7.62)
NEUTS BAND NFR BLD MANUAL: 1 % (ref 0–8)
NEUTS SEG NFR BLD AUTO: 73 % (ref 43–75)
PLATELET # BLD AUTO: 281 THOUSANDS/UL (ref 149–390)
PLATELET BLD QL SMEAR: ADEQUATE
PMV BLD AUTO: 9.5 FL (ref 8.9–12.7)
RBC # BLD AUTO: 3.22 MILLION/UL (ref 3.81–5.12)
RBC MORPH BLD: NORMAL
RH BLD: POSITIVE
RUBV IGG SERPL IA-ACNC: 11.1 IU/ML
SL AMB  POCT GLUCOSE, UA: NORMAL
SL AMB POCT URINE PROTEIN: NORMAL
SPECIMEN EXPIRATION DATE: NORMAL
TREPONEMA PALLIDUM IGG+IGM AB [PRESENCE] IN SERUM OR PLASMA BY IMMUNOASSAY: NORMAL
VARIANT LYMPHS # BLD AUTO: 1 %
VZV IGG SER QL IA: ABNORMAL
WBC # BLD AUTO: 14.77 THOUSAND/UL (ref 4.31–10.16)

## 2024-02-22 PROCEDURE — 81002 URINALYSIS NONAUTO W/O SCOPE: CPT | Performed by: NURSE PRACTITIONER

## 2024-02-22 PROCEDURE — 85007 BL SMEAR W/DIFF WBC COUNT: CPT

## 2024-02-22 PROCEDURE — 86901 BLOOD TYPING SEROLOGIC RH(D): CPT

## 2024-02-22 PROCEDURE — 86900 BLOOD TYPING SEROLOGIC ABO: CPT

## 2024-02-22 PROCEDURE — 87521 HEPATITIS C PROBE&RVRS TRNSC: CPT

## 2024-02-22 PROCEDURE — 86787 VARICELLA-ZOSTER ANTIBODY: CPT

## 2024-02-22 PROCEDURE — 85027 COMPLETE CBC AUTOMATED: CPT

## 2024-02-22 PROCEDURE — 86780 TREPONEMA PALLIDUM: CPT

## 2024-02-22 PROCEDURE — 82728 ASSAY OF FERRITIN: CPT

## 2024-02-22 PROCEDURE — 99213 OFFICE O/P EST LOW 20 MIN: CPT | Performed by: NURSE PRACTITIONER

## 2024-02-22 PROCEDURE — 87522 HEPATITIS C REVRS TRNSCRPJ: CPT

## 2024-02-22 PROCEDURE — 36415 COLL VENOUS BLD VENIPUNCTURE: CPT

## 2024-02-22 PROCEDURE — 82950 GLUCOSE TEST: CPT

## 2024-02-22 PROCEDURE — 86850 RBC ANTIBODY SCREEN: CPT

## 2024-02-22 PROCEDURE — 87340 HEPATITIS B SURFACE AG IA: CPT

## 2024-02-22 PROCEDURE — 86762 RUBELLA ANTIBODY: CPT

## 2024-02-22 NOTE — PATIENT INSTRUCTIONS
Pregnancy at 35 to 38 Weeks   AMBULATORY CARE:   Changes happening with your body:  You are considered full term at the beginning of 37 weeks. Your breathing may be easier if your baby has moved down into a head-down position. You may need to urinate more often because the baby may be pressing on your bladder. You may also feel more discomfort and get tired easily.   Seek care immediately if:   You develop a severe headache that does not go away.    You have new or increased vision changes, such as blurred or spotted vision.    You have new or increased swelling in your face or hands.    You have vaginal spotting or bleeding.    Your water broke or you feel warm water gushing or trickling from your vagina.    Call your obstetrician if:   You have more than 5 contractions in 1 hour.    You notice any changes in your baby's movements.    You have abdominal cramps, pressure, or tightening.    You have a change in vaginal discharge.    You have chills or a fever.    You have vaginal itching, burning, or pain.    You have yellow, green, white, or foul-smelling vaginal discharge.    You have pain or burning when you urinate, less urine than usual, or pink or bloody urine.    You have questions or concerns about your condition or care.    How to care for yourself at this stage of your pregnancy:       Eat a variety of healthy foods.  Healthy foods include fruits, vegetables, whole-grain breads, low-fat dairy foods, beans, lean meats, and fish. Drink liquids as directed. Ask how much liquid to drink each day and which liquids are best for you. Limit caffeine to less than 200 milligrams each day. Limit your intake of fish to 2 servings each week. Choose fish low in mercury such as canned light tuna, shrimp, salmon, cod, or tilapia. Do not  eat fish high in mercury such as swordfish, tilefish, bernadine mackerel, and shark.         Take prenatal vitamins as directed.  Your need for certain vitamins and minerals, such as folic  acid, increases during pregnancy. Prenatal vitamins provide some of the extra vitamins and minerals you need. Prenatal vitamins may also help to decrease the risk of certain birth defects.         Rest as needed.  Put your feet up if you have swelling in your ankles and feet.         Talk to your healthcare provider about exercise.  Moderate exercise can help you stay fit. Your healthcare provider will help you plan an exercise program that is safe for you during pregnancy.         Do not smoke.  Smoking increases your risk of a miscarriage and other health problems during your pregnancy. Smoking can cause your baby to be born early or weigh less at birth. Ask your healthcare provider for information if you need help quitting.    Do not drink alcohol.  Alcohol passes from your body to your baby through the placenta. It can affect your baby's brain development and cause fetal alcohol syndrome (FAS). FAS is a group of conditions that causes mental, behavior, and growth problems.    Talk to your healthcare provider before you take any medicines.  Many medicines may harm your baby if you take them when you are pregnant. Do not take any medicines, vitamins, herbs, or supplements without first talking to your healthcare provider. Never use illegal or street drugs (such as marijuana or cocaine) while you are pregnant.  Safety tips during pregnancy:   Avoid hot tubs and saunas.  Do not use a hot tub or sauna while you are pregnant, especially during your first trimester. Hot tubs and saunas may raise your baby's temperature and increase the risk of birth defects.    Avoid toxoplasmosis.  This is an infection caused by eating raw meat or being around infected cat feces. It can cause birth defects, miscarriages, and other problems. Wash your hands after you touch raw meat. Make sure any meat is well-cooked before you eat it. Avoid raw eggs and unpasteurized milk. Use gloves or ask someone else to clean your cat's litter box  while you are pregnant.         Ask your healthcare provider about travel.  The most comfortable time to travel is during the second trimester. Ask your provider if you can travel after 36 weeks. You may not be able to travel in an airplane after 36 weeks. He or she may also recommend you avoid long road trips.    Changes happening with your baby:  By 38 weeks, your baby may weigh between 6 and 9 pounds. Your baby may be about 14 inches long from the top of the head to the rump (baby's bottom). Your baby hears well enough to know your voice. As your baby gets larger, you may feel fewer kicks and more stretching and rolling. Your baby may move into a head-down position. Your baby will also rest lower in your abdomen.  What you need to know about prenatal care:  Your healthcare provider will check your blood pressure and weight. You may also need the following:  A urine test  may also be done to check for sugar and protein. These can be signs of gestational diabetes or infection. Protein in your urine may also be a sign of preeclampsia. Preeclampsia is a condition that can develop during week 20 or later of your pregnancy. It causes high blood pressure, and it can cause problems with your kidneys and other organs.    A gestational diabetes screen  may be done. Your healthcare provider may order either a 1-step or 2-step oral glucose tolerance test (OGTT).     1-step OGTT:  Your blood sugar level will be tested after you have not eaten for 8 hours (fasting). You will then be given a glucose drink. Your level will be tested again 1 hour and 2 hours after you finish the drink.    2-step OGTT:  You do not have to fast for the first part of the test. You will have the glucose drink at any time of day. Your blood sugar level will be checked 1 hour later. If your blood sugar is higher than a certain level, another test will be ordered. You will fast and your blood sugar level will be tested. You will have the glucose drink.  Your blood will be tested again 1 hour, 2 hours, and 3 hours after you finish the glucose drink.    A blood test  may be done to check for anemia (low iron level).    A Tdap vaccine  may be recommended by your healthcare provider.    A group B strep test  is a test that is done to check for group B strep infection. Group B strep is a type of bacteria that may be found in the vagina or rectum. It can be passed to your baby during delivery if you have it. Your healthcare provider will take swab your vagina or rectum and send the sample to the lab for tests.    Fundal height  is a measurement of your uterus to check your baby's growth. This number is usually the same as the number of weeks that you have been pregnant. Your healthcare provider may also check your baby's position.    Your baby's heart rate  will be checked.    Follow up with your obstetrician as directed:  Write down your questions so you remember to ask them during your visits.  © Copyright [employer] 2023 Information is for End User's use only and may not be sold, redistributed or otherwise used for commercial purposes.  The above information is an  only. It is not intended as medical advice for individual conditions or treatments. Talk to your doctor, nurse or pharmacist before following any medical regimen to see if it is safe and effective for you.

## 2024-02-22 NOTE — ASSESSMENT & PLAN NOTE
She feels well. Seen at [facility] yesterday and dxd with polydramnios.  She is here with her sister today who plans to adopt her baby.  Her sister has been checking her blood sugars and numbers reported seem high. The pt does not feel she has GDM. She would like to be induced. She has not done her prenatal labs yet and was instructed to go downstairs to do them today. She denies LOF/CTX/VB. She did not voice any other concerns. Discussed fetal kick counting.  She was encouraged to start with her perineal/vaginal massages to prevent lacerations during the labor process.

## 2024-02-22 NOTE — PROGRESS NOTES
Problem   35 Weeks Gestation of Pregnancy    Blood Type: A positive. Antibody screen:  Pap neg 2024  GC/CT -  neg/neg  PN1 Labs- ordered  28 Week Labs- ordered    Flu vaccine - declined  Blue folder- given and reviewed  Yellow folder- given and reviewed    Genetic screening- not done  AFP- missed    Level 2- 9/11    TDAP - declined     Delivery consent- signed 1/25, pt plans to adopt the baby out to her sister  Breast pump - declined  Pediatrician - has    Perineal massage - reinforced  GBS swab -   IOL - plans to be induced             35 weeks gestation of pregnancy  She feels well. Seen at [facility] yesterday and dxd with polydramnios.  She is here with her sister today who plans to adopt her baby.  Her sister has been checking her blood sugars and numbers reported seem high. The pt does not feel she has GDM. She would like to be induced. She has not done her prenatal labs yet and was instructed to go downstairs to do them today. She denies LOF/CTX/VB. She did not voice any other concerns. Discussed fetal kick counting.  She was encouraged to start with her perineal/vaginal massages to prevent lacerations during the labor process.

## 2024-02-23 ENCOUNTER — TELEPHONE (OUTPATIENT)
Dept: OBGYN CLINIC | Facility: CLINIC | Age: 29
End: 2024-02-23

## 2024-02-23 ENCOUNTER — TELEPHONE (OUTPATIENT)
Age: 29
End: 2024-02-23

## 2024-02-23 ENCOUNTER — NURSE TRIAGE (OUTPATIENT)
Age: 29
End: 2024-02-23

## 2024-02-23 DIAGNOSIS — Z3A.35 35 WEEKS GESTATION OF PREGNANCY: Primary | ICD-10-CM

## 2024-02-23 DIAGNOSIS — R73.09 ELEVATED GLUCOSE: ICD-10-CM

## 2024-02-23 LAB — HCV RNA SERPL NAA+PROBE-ACNC: NOT DETECTED K[IU]/ML

## 2024-02-23 NOTE — TELEPHONE ENCOUNTER
Called pt- pt states she was told she needed to have an IOL date. Informed pt,  No order in chart from MFM or OB provider.  Pt will need an OB visit for evaluation.  Transferred to [facility] for an appt.

## 2024-02-23 NOTE — TELEPHONE ENCOUNTER
----- Message from Rowan Sanchez sent at 2/23/2024  8:29 AM EST -----  Patient called and she said she is 35 weeks and 3 days pregnant and she was told by vinita and also by Azra at ob gyn that she would need to schedule with an attending to see if she can get induced.  She said she was told her baby is 7 pounds and has a lot of fluid and is concerned the baby is going get too big.  Please call patient back at [phone].  Thank you    See previousl telephone encounter.

## 2024-02-23 NOTE — TELEPHONE ENCOUNTER
"Reason for Disposition  • Information only question and nurse able to answer    Answer Assessment - Initial Assessment Questions  1. REASON FOR CALL or QUESTION: \"What is your reason for calling today?\" or \"How can I best help you?\" or \"What question do you have that I can help answer?\"      Calling to inquire about scheduling induction    Protocols used: Information Only Call - No Triage-ADULT-[place]    "

## 2024-02-23 NOTE — TELEPHONE ENCOUNTER
Patient requested to be seen by MD to sign IOL paperwork. Patient is scheduled with Dr. Forte on 2/27/24 in the [facility] office.

## 2024-02-23 NOTE — TELEPHONE ENCOUNTER
Patient called stating she was waiting for someone at the office to give her a call back about setting up an induction , Pt states she is 35 week and mfm said the baby was 7 lbs , pt needs to make arrangements for her other children and prepare   Patient thought she would get a call back by the end of the day

## 2024-02-23 NOTE — TELEPHONE ENCOUNTER
Referral placed  Routed to [facility] clerical pool  Spoke with patient who states she lives in [place] and wants baby delivered asap. She has severe depression . Explained that her glucose was 165  and needed evaluation from [facility]. Phone number given to patient and [facility]

## 2024-02-23 NOTE — TELEPHONE ENCOUNTER
IBC from pt States was told to call in to set up appt.   Attempted to assist with Glucose appts. Per last notes via chart review   Pt. Declined.  -  Stated she wanted to be induced and pt. Stated that when last seen at [facility] on 07/22/2024 was advised that baby was ready to be induced but needed to speak to OB. Then was told needed today prior to this phone call, that patient needed to speak to MFM.   -  States that gluclose was high due to having a [employer] right before appt yesterday. Had not been aware that they were getting the glucose checked.     -  States that the baby is 7 pounds and was told that she could be induced in the next week.   -  Pt req to speak to a any doctor asap. States has to go to work today at 12pm.   -

## 2024-02-23 NOTE — TELEPHONE ENCOUNTER
Glucose test elevated, Given gestational age, fetal size on most recent US I recommend referral to diabetic pathways rather than 3 hour to not delay any care.

## 2024-02-26 ENCOUNTER — TELEPHONE (OUTPATIENT)
Age: 29
End: 2024-02-26

## 2024-02-26 DIAGNOSIS — O99.013 ANEMIA AFFECTING PREGNANCY IN THIRD TRIMESTER: Primary | ICD-10-CM

## 2024-02-26 NOTE — TELEPHONE ENCOUNTER
----- Message from OMID Topete sent at 2/26/2024 11:34 AM EST -----  Please notify patient her hemoglobin was a bit low so I would like her to start an iron tab daily in addition to her prenatal vitamin. She should take it in the morning with orange juice to enhance her absorption or we can set her up for iron infusions since this is late in her pregnancy. Whatever she prefers.Thanks.

## 2024-02-26 NOTE — RESULT ENCOUNTER NOTE
Please notify patient her hemoglobin was a bit low so I would like her to start an iron tab daily in addition to her prenatal vitamin. She should take it in the morning with orange juice to enhance her absorption or we can set her up for iron infusions since this is late in her pregnancy. Whatever she prefers.Thanks.

## 2024-02-26 NOTE — TELEPHONE ENCOUNTER
Spoke to patient and she said signing IOL papers-   Reviewed maybe she can get one or 2 in prior to delivery but she said she is not in a [facility] area to get them    She said she is always anemia and chewing on ice.  She said she will discuss with provider tomorrow.

## 2024-02-27 ENCOUNTER — ROUTINE PRENATAL (OUTPATIENT)
Dept: OBGYN CLINIC | Facility: MEDICAL CENTER | Age: 29
End: 2024-02-27
Payer: COMMERCIAL

## 2024-02-27 VITALS
HEART RATE: 114 BPM | OXYGEN SATURATION: 99 % | WEIGHT: 209.8 LBS | BODY MASS INDEX: 37.16 KG/M2 | SYSTOLIC BLOOD PRESSURE: 122 MMHG | DIASTOLIC BLOOD PRESSURE: 72 MMHG

## 2024-02-27 DIAGNOSIS — O09.33 PRENATAL CARE INSUFFICIENT, THIRD TRIMESTER: Primary | ICD-10-CM

## 2024-02-27 DIAGNOSIS — O40.3XX0 POLYHYDRAMNIOS AFFECTING PREGNANCY IN THIRD TRIMESTER: ICD-10-CM

## 2024-02-27 DIAGNOSIS — O99.333 MATERNAL TOBACCO USE IN THIRD TRIMESTER: ICD-10-CM

## 2024-02-27 DIAGNOSIS — Z3A.36 36 WEEKS GESTATION OF PREGNANCY: ICD-10-CM

## 2024-02-27 DIAGNOSIS — O09.293 H/O GESTATIONAL DIABETES IN PRIOR PREGNANCY, CURRENTLY PREGNANT, THIRD TRIMESTER: ICD-10-CM

## 2024-02-27 DIAGNOSIS — O99.013 ANEMIA AFFECTING PREGNANCY IN THIRD TRIMESTER: ICD-10-CM

## 2024-02-27 DIAGNOSIS — Z86.32 H/O GESTATIONAL DIABETES IN PRIOR PREGNANCY, CURRENTLY PREGNANT, THIRD TRIMESTER: ICD-10-CM

## 2024-02-27 LAB
SL AMB  POCT GLUCOSE, UA: NEGATIVE
SL AMB POCT URINE PROTEIN: NEGATIVE

## 2024-02-27 PROCEDURE — 99213 OFFICE O/P EST LOW 20 MIN: CPT | Performed by: STUDENT IN AN ORGANIZED HEALTH CARE EDUCATION/TRAINING PROGRAM

## 2024-02-27 PROCEDURE — 81002 URINALYSIS NONAUTO W/O SCOPE: CPT | Performed by: STUDENT IN AN ORGANIZED HEALTH CARE EDUCATION/TRAINING PROGRAM

## 2024-02-27 NOTE — PROGRESS NOTES
"27 yo here for ob visit.  at 36+0.    I started the appointment by reviewing her findings of abnormal glucola, concern for macrosomia, polyhydramnios and iron deficiency anemia. I reviewed that we would presume the diagnosis of diabetes given the above findings and recommend establishing with diabetes education and starting blood glucose checks pending her blood sugars insulin may be recommended in addition to fetal testing. I also reviewed we would recommend IV iron infusions prior to delivery to minimize the risk of needing blood transfusion or having life threatening blood loss at the time of delivery. We discussed if patient is unwilling or unable to do these things she would be not in compliance with our recommendations. If she were compliant and no other issues arose delivery would be recommended for 39 wks.    The patient explained she has difficulty complying with her appointments and these recommendations due to social issues, her job, her location, her children. She reports feeling her child is at an acceptable birth weight of 7lbs at 36 wks and she would like to be delivered because she is uncomfortable, she feels she cannot work, cannot care for her children and the baby is big enough.     We discussed that based on her current diagnoses I would not recommend delivery right now but we could consider delivery as early as 38 wks presuming no other diagnoses or issue arise prior. The following message from Dr. Bennett was received by myself this morning.    \"Ms Bauer has an abnormal 1 hr GTT and is I general non compliant. I see a low ferritin as well. Has a complex social background. Her sister will adopt the baby. Overall, unless she does not present to care, at all- to make a point- 38 weeks would be a reasonable middle ground, ideally 39 weeks, if she does present for testing, receiving IV iron infusions and were to follow recommendations for care with a possible diagnosis of GDM .\"    Upon " hearing the plan for 38 wk induction the patient became more confrontational. She reported that she is already in therapy and that forcing her to continue her pregnancy will harm her mental health further. She stated being at the point she would go to a mental hospital or consider hurting herself. At this point I recommended she go to [place] to be evaluated from a psychiatric standpoint with concerns for self harm, she stated she would not do this. I then stated I would call the squad to do the same and she got up and left the office.     We contacted her local-non emergent police line ([phone]) to report the statements of self harm and they will do a well check.

## 2024-02-27 NOTE — PROGRESS NOTES
Patient here for prenatal visit.  GA: 36w0d    Denies leaking of fluid, bleeding, cramping  Patient uncomfortable and in a lot of pain.  Normal Fetal Movement  Urine is negative  Delivery consent signed 24  Previously declined tdap/flu/rsv/breast pump  Labs-Utd  Would like to sign IOL today  GBS not collected today

## 2024-03-01 ENCOUNTER — TELEPHONE (OUTPATIENT)
Age: 29
End: 2024-03-01

## 2024-03-01 ENCOUNTER — NURSE TRIAGE (OUTPATIENT)
Age: 29
End: 2024-03-01

## 2024-03-01 NOTE — TELEPHONE ENCOUNTER
Regarding: Pt. consent to iron infusions and request order  ----- Message from Jennie Palmer sent at 3/1/2024  2:12 PM EST -----  Pt. Called in asking to set up the iron infusion  I reached out to the Infusion center at the [facility] and was told no order in the system.  Pt. Is no consenting to the infusions and wants to get started on them on Monday.

## 2024-03-06 ENCOUNTER — TELEPHONE (OUTPATIENT)
Age: 29
End: 2024-03-06

## 2024-03-06 DIAGNOSIS — Z3A.36 36 WEEKS GESTATION OF PREGNANCY: Primary | ICD-10-CM

## 2024-03-06 DIAGNOSIS — O99.013 ANEMIA AFFECTING PREGNANCY IN THIRD TRIMESTER: ICD-10-CM

## 2024-03-06 RX ORDER — SODIUM CHLORIDE 9 MG/ML
20 INJECTION, SOLUTION INTRAVENOUS ONCE
Status: CANCELLED | OUTPATIENT
Start: 2024-03-07

## 2024-03-06 NOTE — TELEPHONE ENCOUNTER
Patient called in stating she was supposed to get iron infusions and she has not heard back.  Advised order was pended to the provider to sign and it has not been signed as of this morning.  Message resent to provider to help with scheduling.     Patient also had concerns about her last appointment.  Stated she wanted to get an IOL but was told she was not allowed to unless she got infusions because she would not be in compliance with care.  Stated she lives over an hour away from any offices and is a single mom so it is difficult to come to regular appointments.  She stated 'she did not want to be reviewed as non compliant but her social issues are more of a priority' and 'she wanted to deliver when she wanted and would do whatever it took to get delivered by 38w'    Patient scheduled for MFM tomorrow - scheduled patient an appt with provider at OB office as well since no further appointments were scheduled.   Advised patient that at appointment she can review her concerns and discuss IOL.      Advised patient as soon as there is notification that infusion orders are signed we will be able to schedule her first one - dates/times would be determined at this time.

## 2024-03-06 NOTE — TELEPHONE ENCOUNTER
"Spoke with MFM- her appointment goes until 1pm- spoke with [facility] and patient infusion appointment set up for 3pm. Spoke with patient made her aware. She is going to try to have  for her children to get them off the bus as she typically needs to be home and available for them to get off the bus at 2:30- she reiterated she lives an hour away and needs time to travel.    Attempted to give patient phone number for infusion center to find a better suited appointment for her needs if she could not find someone for her kids, and she declined and she said she wont be calling them, \"everytime she calls someone in this network she gets a different answer.\"  "

## 2024-03-06 NOTE — TELEPHONE ENCOUNTER
"Attempted to make an appointment for iron infusions with patient- patient is very frustrated       If this is going to put her towards IOL she will do it- but if it doesn't then she feels these are pointless to try and accomplish prior to 38 weeks.      Patient admits to having a mental breakdown at her last appointment with TENA and feels like she is on the verge of \"freaking out\" again if she does not just have this baby delivered. At this time patient reports no concerns of self harm, but is getting increasingly frustrated.     Infusion center can get her in for 12pm tomorrow - will check with finance office for expedited approval. [facility] appointment is scheduled till 12:15pm , will message [facility] staff to see if it can be completed to get her to [facility] infusion center for 12pm appointment.   "

## 2024-03-07 ENCOUNTER — TELEPHONE (OUTPATIENT)
Dept: OBGYN CLINIC | Facility: CLINIC | Age: 29
End: 2024-03-07

## 2024-03-07 ENCOUNTER — ULTRASOUND (OUTPATIENT)
Dept: PERINATAL CARE | Facility: OTHER | Age: 29
End: 2024-03-07
Payer: COMMERCIAL

## 2024-03-07 ENCOUNTER — HOSPITAL ENCOUNTER (OUTPATIENT)
Dept: INFUSION CENTER | Facility: CLINIC | Age: 29
Discharge: HOME | End: 2024-03-07
Payer: COMMERCIAL

## 2024-03-07 VITALS
HEART RATE: 113 BPM | RESPIRATION RATE: 18 BRPM | TEMPERATURE: 98.6 F | SYSTOLIC BLOOD PRESSURE: 126 MMHG | DIASTOLIC BLOOD PRESSURE: 67 MMHG

## 2024-03-07 VITALS
BODY MASS INDEX: 37.53 KG/M2 | HEART RATE: 96 BPM | SYSTOLIC BLOOD PRESSURE: 104 MMHG | HEIGHT: 63 IN | DIASTOLIC BLOOD PRESSURE: 60 MMHG | WEIGHT: 211.8 LBS

## 2024-03-07 DIAGNOSIS — O99.013 ANEMIA AFFECTING PREGNANCY IN THIRD TRIMESTER: Primary | ICD-10-CM

## 2024-03-07 DIAGNOSIS — O40.3XX0 POLYHYDRAMNIOS AFFECTING PREGNANCY IN THIRD TRIMESTER: ICD-10-CM

## 2024-03-07 DIAGNOSIS — Z3A.37 37 WEEKS GESTATION OF PREGNANCY: Primary | ICD-10-CM

## 2024-03-07 DIAGNOSIS — O24.419 GESTATIONAL DIABETES MELLITUS (GDM) IN THIRD TRIMESTER, GESTATIONAL DIABETES METHOD OF CONTROL UNSPECIFIED: ICD-10-CM

## 2024-03-07 DIAGNOSIS — Z3A.37 37 WEEKS GESTATION OF PREGNANCY: ICD-10-CM

## 2024-03-07 PROBLEM — O36.63X0 EXCESSIVE FETAL GROWTH AFFECTING MANAGEMENT OF PREGNANCY IN THIRD TRIMESTER: Status: ACTIVE | Noted: 2024-03-07

## 2024-03-07 PROCEDURE — 59025 FETAL NON-STRESS TEST: CPT | Performed by: OBSTETRICS & GYNECOLOGY

## 2024-03-07 PROCEDURE — 76815 OB US LIMITED FETUS(S): CPT | Performed by: OBSTETRICS & GYNECOLOGY

## 2024-03-07 PROCEDURE — 96365 THER/PROPH/DIAG IV INF INIT: CPT

## 2024-03-07 PROCEDURE — 99214 OFFICE O/P EST MOD 30 MIN: CPT | Performed by: OBSTETRICS & GYNECOLOGY

## 2024-03-07 RX ORDER — SODIUM CHLORIDE 9 MG/ML
20 INJECTION, SOLUTION INTRAVENOUS ONCE
Status: COMPLETED | OUTPATIENT
Start: 2024-03-07 | End: 2024-03-07

## 2024-03-07 RX ORDER — SODIUM CHLORIDE 9 MG/ML
20 INJECTION, SOLUTION INTRAVENOUS ONCE
Status: CANCELLED | OUTPATIENT
Start: 2024-03-12

## 2024-03-07 RX ADMIN — SODIUM CHLORIDE 20 ML/HR: 0.9 INJECTION, SOLUTION INTRAVENOUS at 15:14

## 2024-03-07 RX ADMIN — IRON SUCROSE 200 MG: 20 INJECTION, SOLUTION INTRAVENOUS at 15:14

## 2024-03-07 NOTE — PROGRESS NOTES
Pt tolerated remainder of the infusion without issue. PIV removed. Pt left clinic in stable condition.

## 2024-03-07 NOTE — PROGRESS NOTES
Non-Stress Testing:    Non-Stress test, equipment, procedure, and expected outcomes explained. Reviewed fetal kick counts and when to call OB.Verified patient understanding of fetal kick counts with teach back method. Patient reports feeling daily fetal movements. Patient has no questions or concerns.

## 2024-03-07 NOTE — TELEPHONE ENCOUNTER
Florian Parry able to change appointment to virtual given cervical check by M today-     Patient aware visit is virtual advised patient Dr. Rowe will have IOL date and time for her tomorrow during her visit.     Will be attending 3pm iron infusion appointment as well today!    Delivery consent signed and in chart from 1/29 with AL

## 2024-03-07 NOTE — LETTER
March 7, 2024     Mehnaz Durbin MD  [address]  [address]  [place] [address]    Patient: Deysi Bauer   YOB: 1995   Date of Visit: 3/7/2024       Hello,    Dr. Dierking you are scheduled to see her for OB visit tomorrow. Dr. Jann Durbin I know you had an extensive discussion with her last week. She is requesting virtual due to geographic distance, social stressors. I checked her cervix today to facilitate IOL scheduling at 38 weeks, which she is now accepting. I think given presumed GDM, LGA baby, polyhydramnios this is fine and documented as such today. Thanks Shameka for your assistance and communication regarding Deysi.       Brittny Beard MD        CC: MD Shameka Whiting, TRAVIS

## 2024-03-07 NOTE — PROGRESS NOTES
Patient arrives to infusion center for first dose of IV Venofer. Patient offers no complaints today. First dose education provided to patient. PIV established. Patient tolerating infusion at this time. Care handoff given to Lucia Diez RN.     Next appointment: 3/12/24 @ 1300

## 2024-03-07 NOTE — PATIENT INSTRUCTIONS
Nonstress Test for Pregnancy   WHAT YOU NEED TO KNOW:   What do I need to know about a nonstress test?  A nonstress test measures your baby's heart rate and movements. Nonstress means that no stress will be placed on your baby during the test.  How do I prepare for a nonstress test?  Your healthcare provider will talk to you about how to prepare for this test. Your provider may tell you to eat and drink plenty of liquids before your test. If you smoke, you may be asked not to smoke within 2 hours before the test. Your provider will also tell you which medicines to take or not take on the day of your test.  What will happen during a nonstress test?  You may be asked to lie down or recline back for the test on a bed. One or 2 belts with sensors will be placed around your abdomen. Your baby's heart rate will be recorded with a machine. If your baby does not move, your baby may be asleep. Your healthcare provider may make a noise near your abdomen to try to wake your baby. The test usually takes about 20 minutes, but can take longer if your baby needs to be awakened.       What do I need to know about the test results?  Your baby will be expected to move at least 2 times for a certain amount of time. Your baby's heart rate will be expected to go up by a certain number of beats per minute during movement. If your baby does not move as expected, the test may need to be repeated or you may need other tests.  CARE AGREEMENT:   You have the right to help plan your care. Learn about your health condition and how it may be treated. Discuss treatment options with your healthcare providers to decide what care you want to receive. You always have the right to refuse treatment. The above information is an  only. It is not intended as medical advice for individual conditions or treatments. Talk to your doctor, nurse or pharmacist before following any medical regimen to see if it is safe and effective for you.  ©  Copyright [employer]ative 2023 Information is for End User's use only and may not be sold, redistributed or otherwise used for commercial purposes.  Kick Counts in Pregnancy   AMBULATORY CARE:   Kick counts  measure how much your baby is moving in your womb. A kick from your baby can be felt as a twist, turn, swish, roll, or jab. It is common to feel your baby kicking at 26 to 28 weeks of pregnancy. You may feel your baby kick as early as 20 weeks of pregnancy. You may want to start counting at 28 weeks.   Contact your doctor immediately if:   You feel a change in the number of kicks or movements of your baby.     You feel fewer than 10 kicks within 2 hours.     You have questions or concerns about your baby's movements.    Why measure kick counts:  Your baby's movement may provide information about your baby's health. He or she may move less, or not at all, if there are problems. Your baby may move less if he or she is not getting enough oxygen or nutrition from the placenta. Do not smoke while you are pregnant. Smoking decreases the amount of oxygen that gets to your baby. Talk to your healthcare provider if you need help to quit smoking. Tell your healthcare provider as soon as you feel a change in your baby's movements.  When to measure kick counts:   Measure kick counts at the same time every day.      Measure kick counts when your baby is awake and most active. Your baby may be most active in the evening.    How to measure kick counts:  Check that your baby is awake before you measure kick counts. You can wake up your baby by lightly pushing on your belly, walking, or drinking something cold. Your healthcare provider may tell you different ways to measure kick counts. You may be told to do the following:  Use a chart or clock to keep track of the time you start and finish counting.     Sit in a chair or lie on your left side.     Place your hands on the largest part of your belly.     Count until you reach 10 kicks.  Write down how much time it takes to count 10 kicks.     It may take 30 minutes to 2 hours to count 10 kicks. It should not take more than 2 hours to count 10 kicks.    Follow up with your doctor as directed:  Write down your questions so you remember to ask them during your visits.   © Copyright Merative 2023 Information is for End User's use only and may not be sold, redistributed or otherwise used for commercial purposes.  The above information is an  only. It is not intended as medical advice for individual conditions or treatments. Talk to your doctor, nurse or pharmacist before following any medical regimen to see if it is safe and effective for you.

## 2024-03-08 ENCOUNTER — TELEMEDICINE (OUTPATIENT)
Dept: OBGYN CLINIC | Facility: CLINIC | Age: 29
End: 2024-03-08
Payer: COMMERCIAL

## 2024-03-08 VITALS — BODY MASS INDEX: 37.38 KG/M2 | WEIGHT: 211 LBS

## 2024-03-08 DIAGNOSIS — Z3A.37 37 WEEKS GESTATION OF PREGNANCY: ICD-10-CM

## 2024-03-08 DIAGNOSIS — O09.293 H/O GESTATIONAL DIABETES IN PRIOR PREGNANCY, CURRENTLY PREGNANT, THIRD TRIMESTER: ICD-10-CM

## 2024-03-08 DIAGNOSIS — O36.63X0 EXCESSIVE FETAL GROWTH AFFECTING MANAGEMENT OF PREGNANCY IN THIRD TRIMESTER, SINGLE OR UNSPECIFIED FETUS: ICD-10-CM

## 2024-03-08 DIAGNOSIS — Z86.32 H/O GESTATIONAL DIABETES IN PRIOR PREGNANCY, CURRENTLY PREGNANT, THIRD TRIMESTER: ICD-10-CM

## 2024-03-08 DIAGNOSIS — O40.3XX0 POLYHYDRAMNIOS AFFECTING PREGNANCY IN THIRD TRIMESTER: ICD-10-CM

## 2024-03-08 DIAGNOSIS — E66.01 SEVERE OBESITY DUE TO EXCESS CALORIES AFFECTING PREGNANCY IN THIRD TRIMESTER (HCC): ICD-10-CM

## 2024-03-08 DIAGNOSIS — Z34.93 PRENATAL CARE, THIRD TRIMESTER: Primary | ICD-10-CM

## 2024-03-08 DIAGNOSIS — O99.013 ANEMIA AFFECTING PREGNANCY IN THIRD TRIMESTER: ICD-10-CM

## 2024-03-08 DIAGNOSIS — O99.213 SEVERE OBESITY DUE TO EXCESS CALORIES AFFECTING PREGNANCY IN THIRD TRIMESTER (HCC): ICD-10-CM

## 2024-03-08 PROCEDURE — 99213 OFFICE O/P EST LOW 20 MIN: CPT | Performed by: OBSTETRICS & GYNECOLOGY

## 2024-03-08 RX ORDER — CALCIUM CARBONATE 750 MG/1
1 TABLET ORAL DAILY
COMMUNITY

## 2024-03-08 NOTE — ASSESSMENT & PLAN NOTE
Patient's other children were large as well, so she is not surprised/concerned.    Discussed possible GDM which she does not believe is the case. Discussed how it is safer for us to assume it could be for the baby's benefit and therefore how this is contributing to recommendation for 38 week induction.

## 2024-03-08 NOTE — ASSESSMENT & PLAN NOTE
S/p IV iron infusion yesterday.  Has two more scheduled 3/12 and 3/14.  Aware we can also do in hospital.  Encouraged compliance.

## 2024-03-08 NOTE — ASSESSMENT & PLAN NOTE
Continue NST/FILIPPO with MFM.  Discussed increased risk of postpartum hemorrhage associated with this diagnosis.

## 2024-03-08 NOTE — ASSESSMENT & PLAN NOTE
1h GCT elevated, but no 3h GTT done.    Given elevation at 165 in the setting of polyhydramnios and EFW 93%, suspicion for GDM.  Plan to induce at 38 weeks given new onset at term rather than confirm diagnosis and begin treatment.   Discussed small risk of fetal lung immaturity associated with 38 week induction but potential benefit for baby if GDM is present.

## 2024-03-08 NOTE — ASSESSMENT & PLAN NOTE
GBS next visit as virtual visit today.  Kick counts and strict labor precautions reviewed.   IOL scheduled for 3/14/2024 at 8pm.  Aware to eat prior to coming.  Aware that if cervical exam on 3/12 shows more effacement, may just need pitocin/AROM during the day on 3/15/2024.

## 2024-03-08 NOTE — PROGRESS NOTES
37w3d.   Infusions scheduled.   Discuss IOL for 38 weeks. NST /FILIPPO with PNC.   GBS next OB visit.   LOF-no, Bleeding-no, CTX- none. GFM.

## 2024-03-08 NOTE — PROGRESS NOTES
Virtual Regular Visit    Verification of patient location:    Patient is located at Home in the following state in which I hold an active license [place]      Assessment/Plan:    Problem List Items Addressed This Visit    None  Visit Diagnoses       Prenatal care, third trimester    -  Primary                 Reason for visit is   Chief Complaint   Patient presents with    Virtual Regular Visit          Encounter provider Emily Rowe MD    Provider located at [address] [facility] [place]  [address] [place] [address]      Recent Visits  Date Type Provider Dept   03/07/24 Telephone Shameka Rubio RN [facility] [place]   Showing recent visits within past 7 days and meeting all other requirements  Today's Visits  Date Type Provider Dept   03/08/24 Telemedicine Emily Rowe MD [facility] [place]   Showing today's visits and meeting all other requirements  Future Appointments  No visits were found meeting these conditions.  Showing future appointments within next 150 days and meeting all other requirements       The patient was identified by name and date of birth. Deysi Bauer was informed that this is a telemedicine visit and that the visit is being conducted through the [employer] platform. She agrees to proceed..  My office door was closed. No one else was in the room.  She acknowledged consent and understanding of privacy and security of the video platform. The patient has agreed to participate and understands they can discontinue the visit at any time.    Patient is aware this is a billable service.     Subjective  Deysi Bauer is a 28 y.o. female for virtual prenatal visit as this appointment is too distant to her home for in person.  Denies ctxs, VB, LOF.  Good FM.  Does feel some increased sharp pains in her pelvis near her cervix.  She is worried that the baby will come before her induction date but knows to come to the  "hospital early if she is worried.  She is otherwise ready and without concerns.        HPI     Past Medical History:   Diagnosis Date    Abnormal Pap smear of cervix     hx of    Anemia     hx of    Asthma     hx of    Depression     hx of  no meds   therapy weekly    Gastritis     hx of prior to gall bladder removal    Gestational diabetes     hx of with prior pregnancies    Gonorrhea     hx of  tx'd    PTSD (post-traumatic stress disorder)     Substance abuse (HCC)     hx of opiods abuse- none for 8 yrs       Past Surgical History:   Procedure Laterality Date    CHOLECYSTECTOMY      lap diego 2017    HERNIA REPAIR  2016    hernia x3, laparoscopic umbilical    INDUCED       WISDOM TOOTH EXTRACTION         Current Outpatient Medications   Medication Sig Dispense Refill    calcium carbonate (Tums Extra Strength 750) 750 mg chewable tablet Chew 1 tablet daily      Prenatal Vit-Fe Fumarate-FA (PRENATAL VITAMIN PO) Take 1 tablet by mouth daily before breakfast       No current facility-administered medications for this visit.        Allergies   Allergen Reactions    Penicillins Other (See Comments)     Pt states \"I have not taken them since I was very young and am not sure what happens. Probably hives.\"    Cat Hair Extract     Dog Epithelium     Dust Mite Extract     Pollen Extract     Amoxicillin Hives    Azithromycin Hives    Vicodin [Hydrocodone-Acetaminophen] Hives       Review of Systems    Video Exam    Vitals:    24 1141   Weight: 95.7 kg (211 lb)       Physical Exam     Visit Time  Total Visit Duration: 29      Problem List Items Addressed This Visit       H/O gestational diabetes in prior pregnancy, currently pregnant, third trimester     1h GCT elevated, but no 3h GTT done.    Given elevation at 165 in the setting of polyhydramnios and EFW 93%, suspicion for GDM.  Plan to induce at 38 weeks given new onset at term rather than confirm diagnosis and begin treatment.   Discussed small risk " of fetal lung immaturity associated with 38 week induction but potential benefit for baby if GDM is present.          37 weeks gestation of pregnancy     GBS next visit as virtual visit today.  Kick counts and strict labor precautions reviewed.   IOL scheduled for 3/14/2024 at 8pm.  Aware to eat prior to coming.  Aware that if cervical exam on 3/12 shows more effacement, may just need pitocin/AROM during the day on 3/15/2024.          Severe obesity due to excess calories affecting pregnancy in third trimester ([facility])     TWG 61#.          Polyhydramnios affecting pregnancy in third trimester     Continue NST/FILIPPO with MFM.  Discussed increased risk of postpartum hemorrhage associated with this diagnosis.          Anemia affecting pregnancy in third trimester     S/p IV iron infusion yesterday.  Has two more scheduled 3/12 and 3/14.  Aware we can also do in hospital.  Encouraged compliance.          Excessive fetal growth affecting management of pregnancy in third trimester     Patient's other children were large as well, so she is not surprised/concerned.    Discussed possible GDM which she does not believe is the case. Discussed how it is safer for us to assume it could be for the baby's benefit and therefore how this is contributing to recommendation for 38 week induction.           Other Visit Diagnoses       Prenatal care, third trimester    -  Primary

## 2024-03-12 ENCOUNTER — ROUTINE PRENATAL (OUTPATIENT)
Dept: PERINATAL CARE | Facility: OTHER | Age: 29
End: 2024-03-12
Payer: COMMERCIAL

## 2024-03-12 ENCOUNTER — HOSPITAL ENCOUNTER (OUTPATIENT)
Dept: INFUSION CENTER | Facility: CLINIC | Age: 29
Discharge: HOME | End: 2024-03-12
Payer: COMMERCIAL

## 2024-03-12 ENCOUNTER — ROUTINE PRENATAL (OUTPATIENT)
Dept: OBGYN CLINIC | Facility: CLINIC | Age: 29
End: 2024-03-12
Payer: COMMERCIAL

## 2024-03-12 VITALS
HEART RATE: 100 BPM | WEIGHT: 218.2 LBS | SYSTOLIC BLOOD PRESSURE: 126 MMHG | DIASTOLIC BLOOD PRESSURE: 68 MMHG | BODY MASS INDEX: 38.66 KG/M2 | HEIGHT: 63 IN

## 2024-03-12 VITALS
HEART RATE: 104 BPM | BODY MASS INDEX: 38.45 KG/M2 | DIASTOLIC BLOOD PRESSURE: 68 MMHG | HEIGHT: 63 IN | WEIGHT: 217 LBS | SYSTOLIC BLOOD PRESSURE: 122 MMHG

## 2024-03-12 VITALS
SYSTOLIC BLOOD PRESSURE: 123 MMHG | HEART RATE: 99 BPM | RESPIRATION RATE: 18 BRPM | DIASTOLIC BLOOD PRESSURE: 70 MMHG | TEMPERATURE: 98 F

## 2024-03-12 DIAGNOSIS — O40.3XX0 POLYHYDRAMNIOS AFFECTING PREGNANCY IN THIRD TRIMESTER: ICD-10-CM

## 2024-03-12 DIAGNOSIS — Z3A.38 38 WEEKS GESTATION OF PREGNANCY: ICD-10-CM

## 2024-03-12 DIAGNOSIS — Z86.32 H/O GESTATIONAL DIABETES IN PRIOR PREGNANCY, CURRENTLY PREGNANT, THIRD TRIMESTER: ICD-10-CM

## 2024-03-12 DIAGNOSIS — O99.013 ANEMIA AFFECTING PREGNANCY IN THIRD TRIMESTER: ICD-10-CM

## 2024-03-12 DIAGNOSIS — Z3A.38 38 WEEKS GESTATION OF PREGNANCY: Primary | ICD-10-CM

## 2024-03-12 DIAGNOSIS — O09.293 H/O GESTATIONAL DIABETES IN PRIOR PREGNANCY, CURRENTLY PREGNANT, THIRD TRIMESTER: ICD-10-CM

## 2024-03-12 DIAGNOSIS — O36.63X0 EXCESSIVE FETAL GROWTH AFFECTING MANAGEMENT OF PREGNANCY IN THIRD TRIMESTER, SINGLE OR UNSPECIFIED FETUS: ICD-10-CM

## 2024-03-12 DIAGNOSIS — Z34.83 PRENATAL CARE, SUBSEQUENT PREGNANCY, THIRD TRIMESTER: Primary | ICD-10-CM

## 2024-03-12 DIAGNOSIS — O99.013 ANEMIA AFFECTING PREGNANCY IN THIRD TRIMESTER: Primary | ICD-10-CM

## 2024-03-12 LAB
SL AMB  POCT GLUCOSE, UA: NEGATIVE
SL AMB POCT URINE PROTEIN: NEGATIVE

## 2024-03-12 PROCEDURE — 59025 FETAL NON-STRESS TEST: CPT | Performed by: STUDENT IN AN ORGANIZED HEALTH CARE EDUCATION/TRAINING PROGRAM

## 2024-03-12 PROCEDURE — 87184 SC STD DISK METHOD PER PLATE: CPT

## 2024-03-12 PROCEDURE — 87150 DNA/RNA AMPLIFIED PROBE: CPT

## 2024-03-12 PROCEDURE — 96365 THER/PROPH/DIAG IV INF INIT: CPT

## 2024-03-12 PROCEDURE — 99213 OFFICE O/P EST LOW 20 MIN: CPT

## 2024-03-12 PROCEDURE — 81002 URINALYSIS NONAUTO W/O SCOPE: CPT

## 2024-03-12 RX ORDER — SODIUM CHLORIDE 9 MG/ML
20 INJECTION, SOLUTION INTRAVENOUS ONCE
Status: COMPLETED | OUTPATIENT
Start: 2024-03-12 | End: 2024-03-12

## 2024-03-12 RX ORDER — SODIUM CHLORIDE 9 MG/ML
20 INJECTION, SOLUTION INTRAVENOUS ONCE
OUTPATIENT
Start: 2024-03-14

## 2024-03-12 RX ADMIN — IRON SUCROSE 200 MG: 20 INJECTION, SOLUTION INTRAVENOUS at 13:22

## 2024-03-12 RX ADMIN — SODIUM CHLORIDE 20 ML/HR: 9 INJECTION, SOLUTION INTRAVENOUS at 13:22

## 2024-03-12 NOTE — PROGRESS NOTES
Problem   38 Weeks Gestation of Pregnancy    Blood Type: A positive. Antibody screen:  Pap neg   GC/CT -  neg/neg  PN1 Labs- ordered  28 Week Labs- ordered    Flu vaccine - declined  Blue folder- given and reviewed  Yellow folder- given and reviewed    Genetic screening- not done  AFP- missed    Level 2-     TDAP - declined     Delivery consent- signed , pt plans to adopt the baby out to her sister  Breast pump - declined  Pediatrician - has    Perineal massage - reinforced  GBS swab -   IOL - plans to be induced             38 weeks gestation of pregnancy  Deysi Bauer is a 28 y.o.   38w0d who presents for routine PNV.  Prepregnancy BMI 26.58 with a goal weight gain 7 kg (15 lb)-11.5 kg (25 lb). TWG 30.4 kg (67 lb)  Denies OB complaints. Good fetal movement. Denies CTX/LOF/VB.    GBS collected today.   Cervical check completed. Reviewed new instructions for arrival time and location for IOL 3/15 at 7am.   Reviewed labor precautions and FKCs.   Encouraged perineal massages.

## 2024-03-12 NOTE — PROGRESS NOTES
Pt presents to the infusion center for IV venofer offering no complaints. Pt tolerated infusion without incident. Pt aware of next appointment on 3/14 at 1200

## 2024-03-12 NOTE — PATIENT INSTRUCTIONS
Having Your Baby: The Labor Process   AMBULATORY CARE:   The labor process  is a series of 3 stages that your body goes through to deliver your baby. It is not known for sure what causes labor to begin. Hormones made by you and your baby and changes in your uterus may help labor to start. Labor usually starts 2 weeks before or after your due date. Most women do not have their baby exactly on their due date.   Call your obstetrician if:   You have vaginal spotting or bleeding.    Your water broke or you feel warm water gushing or trickling from your vagina.    You have more than 5 contractions in 1 hour.    You have bloody mucus or show.    You notice any changes in your baby's movements.     You have abdominal cramps, pressure, or tightening.    You have a change in vaginal discharge.    You have questions or concerns about your condition or care.    First stage of labor:  The first stage of labor includes latent (early) labor and active labor. This stage may last up to 12 hours if this is your first pregnancy. It may last up to 10 hours if you delivered a baby before. Your uterus will contract to prepare your cervix for delivery and to push your baby out of the birth canal. Your cervix will dilate (widen) and efface (soften and become thinner). Your contractions may last from 30 to 60 seconds. The contractions usually start in the back and move to the front. You may also have a pink, clear, or slightly bloody discharge called bloody show. Bloody show is caused by the movement of a mucus plug from your cervix. During pregnancy the mucus plug blocks your cervix to prevent it from opening  What to do during early labor:  Early labor may last for several hours. You will most likely be at home during early labor. Rest as much as possible while you are at home. Have someone rub your back. It may be helpful to place ice packs on your lower back. Go for a short walk if you are able. Drink water and suck on ice chips. Ask  your healthcare provider if it is okay to eat during early labor.   How to know when you are in active labor:  This stage may last up to 12 hours if this is your first pregnancy. It may last up to 10 hours if you delivered a baby before. Your contractions will get stronger, last longer, and happen more frequently. They will also become more intense and painful. Time your contractions from the beginning of one to the beginning of the next. Write this information down for 1 hour. Your healthcare provider will tell you when to go to the hospital or birthing center. This will be based on how many minutes apart your contractions are.   Second stage of labor:  The second stage is the time between full cervix dilation and the birth of your baby. Your cervix will be completely dilated to 10 centimeters and your baby will be ready to be born. The second stage usually lasts 20 minutes to 2 hours. It may last up to 3 hours if this is your first baby.  You may be given antibiotics to fight a bacterial infection you have or prevent an infection during delivery.    Healthcare providers will help you find a position for giving birth that is comfortable for you. You can lie on your back, have your feet up in stirrups, or squat.    You may feel pressure on your rectum and the urge to push. This pressure is caused by the movement of your baby's head down the birth canal. Your healthcare provider will have you push when you feel the urge. He or she will guide your baby out of the birth canal. Forceps or suction may be used to help deliver your baby. You may also need an episiotomy (incision) to make the vaginal opening larger. This will make more room for your baby. Your perineum will be protected during delivery. This may be with a warm compress or massage of the area.    At least 1 minute after your baby is born, your healthcare provider will put clamps on the umbilical cord. The cord will then be cut. Your baby may be placed on  your chest right away. He or she may also start breastfeeding.    Third stage of labor:  The placenta (afterbirth) is delivered during this stage. After you give birth, your uterus will continue to contract to help push out the placenta. These contractions will begin 5 to 30 minutes after you give birth. Your healthcare provider will tell you when to push. You may have chills or shakiness during this stage. You may be given medicine to help prevent heavy bleeding that can happen during this stage.  How to manage labor pain:  Pain can be managed naturally or with medicines. You can naturally manage pain by using relaxation methods and controlled breathing. There are different types of medicines that can be used to relieve pain while you are in labor. These medicines may be given through an IV or an epidural (thin catheter in your lower back). Talk with your healthcare about your options for pain medicines if you choose to use them. Tell your provider if you prefer not to have any pain control medicines during labor.   © Copyright [employer] 2023 Information is for End User's use only and may not be sold, redistributed or otherwise used for commercial purposes.  The above information is an  only. It is not intended as medical advice for individual conditions or treatments. Talk to your doctor, nurse or pharmacist before following any medical regimen to see if it is safe and effective for you.  Induction of Labor   WHAT YOU NEED TO KNOW:   What is induction of labor?  Induction of labor is a procedure to induce (start) your labor before it begins on its own. Medicines and other methods are used to start contractions and help your cervix soften, thin (efface), and dilate (open). You may be given antibiotics to fight a bacterial infection you have or prevent an infection during delivery.  Why might I need induction of labor?   A health problem you have, such as high blood pressure or diabetes    A health  problem your baby has, such as a slow heartbeat or poor growth inside the womb     Problems related to your pregnancy, such as infection of the amniotic fluid, your water breaks before labor begins, or you have too little amniotic fluid    What happens during induction of labor?  Your healthcare provider may use one or more of the following to induce labor:  Cervical ripening  is a process that helps to soften and thin out your cervix. Medicines called prostaglandins may be used to ripen your cervix. These medicines can be inserted into your vagina or taken as a pill. Other methods can also be used to dilate the cervix. This includes a catheter with an inflatable balloon on the end that is inserted into your cervix. Saline injected through the catheter helps the balloon to expand. A substance that absorbs water may also be inserted into your cervix to help dilate it.     Stripping the membranes  is a procedure that causes your body to release prostaglandins naturally. Prostaglandins soften the cervix and may help to cause contractions. Your healthcare provider will sweep a gloved finger over the membranes that connect the amniotic sac to the uterus wall.     Rupturing the amniotic sac  is a procedure that is used to cause your water to break. Your healthcare provider will use a small tool to make a hole in your amniotic sac. This may help contractions to start.     Oxytocin  may be given through an IV to cause contractions to start and stay strong and regular.    What are the risks of induction of labor?  Medicines used to induce labor may cause too many contractions. This can lower your baby's heartbeat and decrease his or her oxygen supply. Induction of labor also increases the risk of umbilical cord prolapse. This condition causes the umbilical cord to slip back into the vagina before delivery. It can compress the cord and decrease your baby's oxygen supply. Medical induction may cause an infection in you or your  baby. Medical induction may also increase your risk for a  section (), especially if it is the first time you give birth. Your uterus may rupture if you have had a  before.  CARE AGREEMENT:   You have the right to help plan your care. Learn about your health condition and how it may be treated. Discuss treatment options with your healthcare providers to decide what care you want to receive. You always have the right to refuse treatment. The above information is an  only. It is not intended as medical advice for individual conditions or treatments. Talk to your doctor, nurse or pharmacist before following any medical regimen to see if it is safe and effective for you.  ©  [employer]2023 Information is for End User's use only and may not be sold, redistributed or otherwise used for commercial purposes.

## 2024-03-12 NOTE — PROGRESS NOTES
NST was done today. Pt. Reported active fetal movement at home between visit. Daily fetal kick count reviewed and emphasized. Patient verbalized understanding of all and was receptive.

## 2024-03-12 NOTE — ASSESSMENT & PLAN NOTE
Deysi Bauer is a 28 y.o.   38w0d who presents for routine PNV.  Prepregnancy BMI 26.58 with a goal weight gain 7 kg (15 lb)-11.5 kg (25 lb). TWG 30.4 kg (67 lb)  Denies OB complaints. Good fetal movement. Denies CTX/LOF/VB.    GBS collected today.   Cervical check completed. Reviewed new instructions for arrival time and location for IOL 3/15 at 7am.   Reviewed labor precautions and FKCs.   Encouraged perineal massages.

## 2024-03-12 NOTE — PROGRESS NOTES
Patient presents for a routine prenatal visit    38W0D  Good Fetal Movement  No LOF,bleeding, or discharge   Has some cramping.  No current complaints at this time.   Delivery consent is signed  IOL scheduled on 3/15

## 2024-03-14 LAB — GP B STREP DNA SPEC QL NAA+PROBE: POSITIVE

## 2024-03-15 ENCOUNTER — HOSPITAL ENCOUNTER (OUTPATIENT)
Dept: LABOR AND DELIVERY | Facility: HOSPITAL | Age: 29
Discharge: HOME | DRG: 560 | End: 2024-03-15
Payer: COMMERCIAL

## 2024-03-15 ENCOUNTER — ANESTHESIA (INPATIENT)
Dept: ANESTHESIOLOGY | Facility: HOSPITAL | Age: 29
DRG: 560 | End: 2024-03-15
Payer: COMMERCIAL

## 2024-03-15 ENCOUNTER — ANESTHESIA EVENT (INPATIENT)
Dept: ANESTHESIOLOGY | Facility: HOSPITAL | Age: 29
DRG: 560 | End: 2024-03-15
Payer: COMMERCIAL

## 2024-03-15 ENCOUNTER — HOSPITAL ENCOUNTER (INPATIENT)
Facility: HOSPITAL | Age: 29
LOS: 2 days | Discharge: HOME | DRG: 560 | End: 2024-03-17
Attending: OBSTETRICS & GYNECOLOGY | Admitting: OBSTETRICS & GYNECOLOGY
Payer: COMMERCIAL

## 2024-03-15 DIAGNOSIS — O09.33 PRENATAL CARE INSUFFICIENT, THIRD TRIMESTER: ICD-10-CM

## 2024-03-15 DIAGNOSIS — Z3A.38 38 WEEKS GESTATION OF PREGNANCY: Primary | ICD-10-CM

## 2024-03-15 PROBLEM — O24.419 GESTATIONAL DIABETES: Chronic | Status: ACTIVE | Noted: 2024-03-15

## 2024-03-15 PROBLEM — O24.419 GESTATIONAL DIABETES: Status: ACTIVE | Noted: 2024-03-15

## 2024-03-15 LAB
ABO GROUP BLD: NORMAL
ALBUMIN SERPL BCP-MCNC: 3.3 G/DL (ref 3.5–5)
ALP SERPL-CCNC: 94 U/L (ref 34–104)
ALT SERPL W P-5'-P-CCNC: 9 U/L (ref 7–52)
ANION GAP SERPL CALCULATED.3IONS-SCNC: 10 MMOL/L (ref 4–13)
AST SERPL W P-5'-P-CCNC: 14 U/L (ref 13–39)
BILIRUB SERPL-MCNC: 0.3 MG/DL (ref 0.2–1)
BLD GP AB SCN SERPL QL: NEGATIVE
BUN SERPL-MCNC: 4 MG/DL (ref 5–25)
CALCIUM ALBUM COR SERPL-MCNC: 9.6 MG/DL (ref 8.3–10.1)
CALCIUM SERPL-MCNC: 9 MG/DL (ref 8.4–10.2)
CHLORIDE SERPL-SCNC: 105 MMOL/L (ref 96–108)
CO2 SERPL-SCNC: 20 MMOL/L (ref 21–32)
CREAT SERPL-MCNC: 0.45 MG/DL (ref 0.6–1.3)
ERYTHROCYTE [DISTWIDTH] IN BLOOD BY AUTOMATED COUNT: 15.9 % (ref 11.6–15.1)
GFR SERPL CREATININE-BSD FRML MDRD: 138 ML/MIN/1.73SQ M
GLUCOSE SERPL-MCNC: 112 MG/DL (ref 65–140)
GLUCOSE SERPL-MCNC: 122 MG/DL (ref 65–140)
GLUCOSE SERPL-MCNC: 79 MG/DL (ref 65–140)
GLUCOSE SERPL-MCNC: 80 MG/DL (ref 65–140)
GLUCOSE SERPL-MCNC: 80 MG/DL (ref 65–140)
GLUCOSE SERPL-MCNC: 81 MG/DL (ref 65–140)
GLUCOSE SERPL-MCNC: 92 MG/DL (ref 65–140)
HCT VFR BLD AUTO: 30.8 % (ref 34.8–46.1)
HGB BLD-MCNC: 9.9 G/DL (ref 11.5–15.4)
HIV 1+2 AB+HIV1 P24 AG SERPL QL IA: NORMAL
HIV1 P24 AG SER QL: NORMAL
HOLD SPECIMEN: NORMAL
MCH RBC QN AUTO: 30.8 PG (ref 26.8–34.3)
MCHC RBC AUTO-ENTMCNC: 32.1 G/DL (ref 31.4–37.4)
MCV RBC AUTO: 96 FL (ref 82–98)
PLATELET # BLD AUTO: 268 THOUSANDS/UL (ref 149–390)
PMV BLD AUTO: 9.4 FL (ref 8.9–12.7)
POTASSIUM SERPL-SCNC: 3.6 MMOL/L (ref 3.5–5.3)
PROT SERPL-MCNC: 6.7 G/DL (ref 6.4–8.4)
RBC # BLD AUTO: 3.21 MILLION/UL (ref 3.81–5.12)
RH BLD: POSITIVE
SODIUM SERPL-SCNC: 135 MMOL/L (ref 135–147)
SPECIMEN EXPIRATION DATE: NORMAL
TREPONEMA PALLIDUM IGG+IGM AB [PRESENCE] IN SERUM OR PLASMA BY IMMUNOASSAY: NORMAL
WBC # BLD AUTO: 15.9 THOUSAND/UL (ref 4.31–10.16)

## 2024-03-15 PROCEDURE — NC001 PR NO CHARGE: Performed by: OBSTETRICS & GYNECOLOGY

## 2024-03-15 PROCEDURE — 85027 COMPLETE CBC AUTOMATED: CPT

## 2024-03-15 PROCEDURE — 86901 BLOOD TYPING SEROLOGIC RH(D): CPT

## 2024-03-15 PROCEDURE — 81403 MOPATH PROCEDURE LEVEL 4: CPT

## 2024-03-15 PROCEDURE — 87806 HIV AG W/HIV1&2 ANTB W/OPTIC: CPT

## 2024-03-15 PROCEDURE — 10907ZC DRAINAGE OF AMNIOTIC FLUID, THERAPEUTIC FROM PRODUCTS OF CONCEPTION, VIA NATURAL OR ARTIFICIAL OPENING: ICD-10-PCS | Performed by: OBSTETRICS & GYNECOLOGY

## 2024-03-15 PROCEDURE — 82948 REAGENT STRIP/BLOOD GLUCOSE: CPT

## 2024-03-15 PROCEDURE — 4A1HXCZ MONITORING OF PRODUCTS OF CONCEPTION, CARDIAC RATE, EXTERNAL APPROACH: ICD-10-PCS | Performed by: OBSTETRICS & GYNECOLOGY

## 2024-03-15 PROCEDURE — 86850 RBC ANTIBODY SCREEN: CPT

## 2024-03-15 PROCEDURE — 86780 TREPONEMA PALLIDUM: CPT

## 2024-03-15 PROCEDURE — 86900 BLOOD TYPING SEROLOGIC ABO: CPT

## 2024-03-15 PROCEDURE — 80053 COMPREHEN METABOLIC PANEL: CPT | Performed by: STUDENT IN AN ORGANIZED HEALTH CARE EDUCATION/TRAINING PROGRAM

## 2024-03-15 RX ORDER — OXYTOCIN/RINGER'S LACTATE 30/500 ML
1-30 PLASTIC BAG, INJECTION (ML) INTRAVENOUS
Status: DISCONTINUED | OUTPATIENT
Start: 2024-03-15 | End: 2024-03-16

## 2024-03-15 RX ORDER — CALCIUM CARBONATE 500 MG/1
1000 TABLET, CHEWABLE ORAL 3 TIMES DAILY PRN
Status: DISCONTINUED | OUTPATIENT
Start: 2024-03-15 | End: 2024-03-16

## 2024-03-15 RX ORDER — ONDANSETRON 2 MG/ML
4 INJECTION INTRAMUSCULAR; INTRAVENOUS EVERY 6 HOURS PRN
Status: DISCONTINUED | OUTPATIENT
Start: 2024-03-15 | End: 2024-03-16

## 2024-03-15 RX ORDER — ACETAMINOPHEN 325 MG/1
975 TABLET ORAL EVERY 6 HOURS PRN
Status: DISCONTINUED | OUTPATIENT
Start: 2024-03-15 | End: 2024-03-16

## 2024-03-15 RX ORDER — LIDOCAINE HYDROCHLORIDE AND EPINEPHRINE 15; 5 MG/ML; UG/ML
INJECTION, SOLUTION EPIDURAL
Status: COMPLETED | OUTPATIENT
Start: 2024-03-15 | End: 2024-03-15

## 2024-03-15 RX ORDER — BUPIVACAINE HYDROCHLORIDE 2.5 MG/ML
30 INJECTION, SOLUTION EPIDURAL; INFILTRATION; INTRACAUDAL ONCE AS NEEDED
Status: DISCONTINUED | OUTPATIENT
Start: 2024-03-15 | End: 2024-03-16

## 2024-03-15 RX ORDER — CLONIDINE 100 UG/ML
INJECTION, SOLUTION EPIDURAL AS NEEDED
Status: DISCONTINUED | OUTPATIENT
Start: 2024-03-15 | End: 2024-03-18 | Stop reason: HOSPADM

## 2024-03-15 RX ORDER — TERBUTALINE SULFATE 1 MG/ML
INJECTION, SOLUTION SUBCUTANEOUS
Status: COMPLETED
Start: 2024-03-15 | End: 2024-03-16

## 2024-03-15 RX ORDER — BUPIVACAINE HYDROCHLORIDE 2.5 MG/ML
INJECTION, SOLUTION EPIDURAL; INFILTRATION; INTRACAUDAL AS NEEDED
Status: DISCONTINUED | OUTPATIENT
Start: 2024-03-15 | End: 2024-03-18 | Stop reason: HOSPADM

## 2024-03-15 RX ORDER — SODIUM CHLORIDE, SODIUM LACTATE, POTASSIUM CHLORIDE, CALCIUM CHLORIDE 600; 310; 30; 20 MG/100ML; MG/100ML; MG/100ML; MG/100ML
125 INJECTION, SOLUTION INTRAVENOUS CONTINUOUS
Status: DISCONTINUED | OUTPATIENT
Start: 2024-03-15 | End: 2024-03-16

## 2024-03-15 RX ADMIN — VANCOMYCIN HYDROCHLORIDE 2000 MG: 1 INJECTION, POWDER, LYOPHILIZED, FOR SOLUTION INTRAVENOUS at 17:54

## 2024-03-15 RX ADMIN — ROPIVACAINE HYDROCHLORIDE: 2 INJECTION, SOLUTION EPIDURAL; INFILTRATION at 22:05

## 2024-03-15 RX ADMIN — Medication 2 MILLI-UNITS/MIN: at 10:06

## 2024-03-15 RX ADMIN — SODIUM CHLORIDE, SODIUM LACTATE, POTASSIUM CHLORIDE, AND CALCIUM CHLORIDE 125 ML/HR: .6; .31; .03; .02 INJECTION, SOLUTION INTRAVENOUS at 22:05

## 2024-03-15 RX ADMIN — LIDOCAINE HYDROCHLORIDE AND EPINEPHRINE 3 ML: 15; 5 INJECTION, SOLUTION EPIDURAL at 21:50

## 2024-03-15 RX ADMIN — CLONIDINE HYDROCHLORIDE 100 MCG: 0.1 INJECTION, SOLUTION EPIDURAL at 22:47

## 2024-03-15 RX ADMIN — SODIUM CHLORIDE, SODIUM LACTATE, POTASSIUM CHLORIDE, AND CALCIUM CHLORIDE 125 ML/HR: .6; .31; .03; .02 INJECTION, SOLUTION INTRAVENOUS at 15:15

## 2024-03-15 RX ADMIN — SODIUM CHLORIDE, SODIUM LACTATE, POTASSIUM CHLORIDE, AND CALCIUM CHLORIDE 125 ML/HR: .6; .31; .03; .02 INJECTION, SOLUTION INTRAVENOUS at 09:09

## 2024-03-15 RX ADMIN — VANCOMYCIN HYDROCHLORIDE 2000 MG: 1 INJECTION, POWDER, LYOPHILIZED, FOR SOLUTION INTRAVENOUS at 09:10

## 2024-03-15 RX ADMIN — BUPIVACAINE HYDROCHLORIDE 6 ML: 2.5 INJECTION, SOLUTION EPIDURAL; INFILTRATION; INTRACAUDAL; PERINEURAL at 22:47

## 2024-03-15 RX ADMIN — LIDOCAINE HYDROCHLORIDE AND EPINEPHRINE 2 ML: 15; 5 INJECTION, SOLUTION EPIDURAL at 21:54

## 2024-03-15 NOTE — OB LABOR/OXYTOCIN SAFETY PROGRESS
Oxytocin Safety Progress Check Note - Deysi Bauer 28 y.o. female MRN: [id number]    Unit/Bed#: -01 Encounter: [id number]    Dose (luz-units/min) Oxytocin: 10 luz-units/min  Contraction Frequency (minutes): 2.5-3  Contraction Intensity: Mild  Uterine Activity Characteristics: Irregular  Cervical Dilation: 3        Cervical Effacement: 50  Fetal Station: -3  Baseline Rate (FHR): 135 bpm  Fetal Heart Rate (FHT): 148 BPM  FHR Category: 2               Vital Signs:   Vitals:    03/15/24 1353   BP: 135/73   Pulse: 99   Resp:    Temp:    SpO2:        Notes/comments:   SVE as above. Category II tracing for itnermittent variables. Plan to turn pit down to 6 and repositioning now. D/w Dr. Carmona.        Mary Desir MD 3/15/2024 2:47 PM

## 2024-03-15 NOTE — OB LABOR/OXYTOCIN SAFETY PROGRESS
Oxytocin Safety Progress Check Note - Deysi Bauer 28 y.o. female MRN: [id number]    Unit/Bed#: -01 Encounter: [id number]    Dose (luz-units/min) Oxytocin: 6 luz-units/min  Contraction Frequency (minutes): 3-4  Contraction Intensity: Mild  Uterine Activity Characteristics: Irregular  Cervical Dilation: 3        Cervical Effacement: 50  Fetal Station: -3  Baseline Rate (FHR): 135 bpm  Fetal Heart Rate (FHT): 110 BPM  FHR Category: 1               Vital Signs:   Vitals:    03/15/24 1200   BP: 113/64   Pulse:    Resp:    Temp: 98.2 °F (36.8 °C)   SpO2:        Notes/comments:   SVE deferred. Pitocin has been on for 2 hours and patient is comfortable. Continue pitocin titration. D/w Dr. Kristine Desir MD 3/15/2024 12:14 PM

## 2024-03-15 NOTE — ASSESSMENT & PLAN NOTE
- Pain well controlled with oral analgesics  - Voiding spontaneously  - Tolerating PO fluids and solids  - Ambulating without difficulty  - Encourage breastfeeding and familial bonding  - Contraception: declines
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
## 2024-03-15 NOTE — PLAN OF CARE
Problem: PAIN - ADULT  Goal: Verbalizes/displays adequate comfort level or baseline comfort level  Description: Interventions:  - Encourage patient to monitor pain and request assistance  - Assess pain using appropriate pain scale  - Administer analgesics based on type and severity of pain and evaluate response  - Implement non-pharmacological measures as appropriate and evaluate response  - Consider cultural and social influences on pain and pain management  - Notify physician/advanced practitioner if interventions unsuccessful or patient reports new pain  Outcome: Progressing     Problem: INFECTION - ADULT  Goal: Absence or prevention of progression during hospitalization  Description: INTERVENTIONS:  - Assess and monitor for signs and symptoms of infection  - Monitor lab/diagnostic results  - Monitor all insertion sites, i.e. indwelling lines, tubes, and drains  - Monitor endotracheal if appropriate and nasal secretions for changes in amount and color  - [place] appropriate cooling/warming therapies per order  - Administer medications as ordered  - Instruct and encourage patient and family to use good hand hygiene technique  - Identify and instruct in appropriate isolation precautions for identified infection/condition  Outcome: Progressing     Problem: Knowledge Deficit  Goal: Patient/family/caregiver demonstrates understanding of disease process, treatment plan, medications, and discharge instructions  Description: Complete learning assessment and assess knowledge base.  Interventions:  - Provide teaching at level of understanding  - Provide teaching via preferred learning methods  Outcome: Progressing     Problem: DISCHARGE PLANNING  Goal: Discharge to home or other facility with appropriate resources  Description: INTERVENTIONS:  - Identify barriers to discharge w/patient and caregiver  - Arrange for needed discharge resources and transportation as appropriate  - Identify discharge learning needs (meds,  wound care, etc.)  - Arrange for interpretive services to assist at discharge as needed  - Refer to Case Management Department for coordinating discharge planning if the patient needs post-hospital services based on physician/advanced practitioner order or complex needs related to functional status, cognitive ability, or social support system  Outcome: Progressing     Problem: BIRTH - VAGINAL/ SECTION  Goal: Fetal and maternal status remain reassuring during the birth process  Description: INTERVENTIONS:  - Monitor vital signs  - Monitor fetal heart rate  - Monitor uterine activity  - Monitor labor progression (vaginal delivery)  - DVT prophylaxis  - Antibiotic prophylaxis  Outcome: Progressing  Goal: Emotionally satisfying birthing experience for mother/fetus  Description: Interventions:  - Assess, plan, implement and evaluate the nursing care given to the patient in labor  - Advocate the philosophy that each childbirth experience is a unique experience and support the family's chosen level of involvement and control during the labor process   - Actively participate in both the patient's and family's teaching of the birth process  - Consider cultural, Church and age-specific factors and plan care for the patient in labor  Outcome: Progressing

## 2024-03-15 NOTE — ASSESSMENT & PLAN NOTE
Lab Results   Component Value Date    HGBA1C 5.8 (H) 01/07/2015       Recent Labs     03/15/24  1424 03/15/24  1623 03/15/24  2110 03/16/24  0139   POCGLU 92 80 80 115     Blood Sugar Average: Last 72 hrs:  (P) [id number]    1hr Glucola of 165  No 3hr GTT   History of GDM  2hr GTT outpatient

## 2024-03-16 LAB
BASE EXCESS BLDCOA CALC-SCNC: -3.2 MMOL/L (ref 3–11)
BASE EXCESS BLDCOV CALC-SCNC: -1 MMOL/L (ref 1–9)
GLUCOSE SERPL-MCNC: 115 MG/DL (ref 65–140)
HCO3 BLDCOA-SCNC: 25.1 MMOL/L (ref 17.3–27.3)
HCO3 BLDCOV-SCNC: 23.3 MMOL/L (ref 12.2–28.6)
O2 CT VFR BLDCOA CALC: 8.7 ML/DL
OXYHGB MFR BLDCOA: 39.2 %
OXYHGB MFR BLDCOV: 79.9 %
PCO2 BLDCOA: 58 MM[HG] (ref 30–60)
PCO2 BLDCOV: 37.7 MM HG (ref 27–43)
PH BLDCOA: 7.25 [PH] (ref 7.23–7.43)
PH BLDCOV: 7.41 [PH] (ref 7.19–7.49)
PO2 BLDCOA: 21 MM HG (ref 5–25)
PO2 BLDCOV: 35.6 MM HG (ref 15–45)
SAO2 % BLDCOV: 18.3 ML/DL

## 2024-03-16 PROCEDURE — 59409 OBSTETRICAL CARE: CPT | Performed by: STUDENT IN AN ORGANIZED HEALTH CARE EDUCATION/TRAINING PROGRAM

## 2024-03-16 PROCEDURE — 88307 TISSUE EXAM BY PATHOLOGIST: CPT | Performed by: PATHOLOGY

## 2024-03-16 PROCEDURE — 88313 SPECIAL STAINS GROUP 2: CPT | Performed by: PATHOLOGY

## 2024-03-16 PROCEDURE — 82805 BLOOD GASES W/O2 SATURATION: CPT | Performed by: OBSTETRICS & GYNECOLOGY

## 2024-03-16 PROCEDURE — 82948 REAGENT STRIP/BLOOD GLUCOSE: CPT

## 2024-03-16 PROCEDURE — NC001 PR NO CHARGE: Performed by: STUDENT IN AN ORGANIZED HEALTH CARE EDUCATION/TRAINING PROGRAM

## 2024-03-16 RX ORDER — IBUPROFEN 600 MG/1
600 TABLET ORAL EVERY 6 HOURS
Status: DISCONTINUED | OUTPATIENT
Start: 2024-03-16 | End: 2024-03-17 | Stop reason: HOSPADM

## 2024-03-16 RX ORDER — DIPHENHYDRAMINE HCL 25 MG
25 TABLET ORAL EVERY 6 HOURS PRN
Status: DISCONTINUED | OUTPATIENT
Start: 2024-03-16 | End: 2024-03-17 | Stop reason: HOSPADM

## 2024-03-16 RX ORDER — ONDANSETRON 2 MG/ML
4 INJECTION INTRAMUSCULAR; INTRAVENOUS EVERY 8 HOURS PRN
Status: DISCONTINUED | OUTPATIENT
Start: 2024-03-16 | End: 2024-03-17 | Stop reason: HOSPADM

## 2024-03-16 RX ORDER — DOCUSATE SODIUM 100 MG/1
100 CAPSULE, LIQUID FILLED ORAL 2 TIMES DAILY
Status: DISCONTINUED | OUTPATIENT
Start: 2024-03-16 | End: 2024-03-17 | Stop reason: HOSPADM

## 2024-03-16 RX ORDER — PHENYLEPHRINE HCL IN 0.9% NACL 1 MG/10 ML
SYRINGE (ML) INTRAVENOUS AS NEEDED
Status: DISCONTINUED | OUTPATIENT
Start: 2024-03-16 | End: 2024-03-18 | Stop reason: HOSPADM

## 2024-03-16 RX ORDER — CALCIUM CARBONATE 500 MG/1
1000 TABLET, CHEWABLE ORAL DAILY PRN
Status: DISCONTINUED | OUTPATIENT
Start: 2024-03-16 | End: 2024-03-17 | Stop reason: HOSPADM

## 2024-03-16 RX ORDER — SIMETHICONE 80 MG
80 TABLET,CHEWABLE ORAL EVERY 6 HOURS PRN
Status: DISCONTINUED | OUTPATIENT
Start: 2024-03-16 | End: 2024-03-17 | Stop reason: HOSPADM

## 2024-03-16 RX ORDER — BENZOCAINE/MENTHOL 6 MG-10 MG
1 LOZENGE MUCOUS MEMBRANE DAILY PRN
Status: DISCONTINUED | OUTPATIENT
Start: 2024-03-16 | End: 2024-03-17 | Stop reason: HOSPADM

## 2024-03-16 RX ORDER — OXYTOCIN/RINGER'S LACTATE 30/500 ML
250 PLASTIC BAG, INJECTION (ML) INTRAVENOUS ONCE
Status: DISCONTINUED | OUTPATIENT
Start: 2024-03-16 | End: 2024-03-17 | Stop reason: HOSPADM

## 2024-03-16 RX ORDER — ACETAMINOPHEN 325 MG/1
650 TABLET ORAL EVERY 4 HOURS PRN
Status: DISCONTINUED | OUTPATIENT
Start: 2024-03-16 | End: 2024-03-17 | Stop reason: HOSPADM

## 2024-03-16 RX ADMIN — Medication 100 MCG: at 00:22

## 2024-03-16 RX ADMIN — TERBUTALINE SULFATE 0.25 MG: 1 INJECTION SUBCUTANEOUS at 00:26

## 2024-03-16 RX ADMIN — SODIUM CHLORIDE, SODIUM LACTATE, POTASSIUM CHLORIDE, AND CALCIUM CHLORIDE 999 ML/HR: .6; .31; .03; .02 INJECTION, SOLUTION INTRAVENOUS at 05:15

## 2024-03-16 RX ADMIN — DOCUSATE SODIUM 100 MG: 100 CAPSULE, LIQUID FILLED ORAL at 09:24

## 2024-03-16 RX ADMIN — SODIUM CHLORIDE, SODIUM LACTATE, POTASSIUM CHLORIDE, AND CALCIUM CHLORIDE 999 ML/HR: .6; .31; .03; .02 INJECTION, SOLUTION INTRAVENOUS at 00:30

## 2024-03-16 RX ADMIN — IBUPROFEN 600 MG: 600 TABLET ORAL at 06:51

## 2024-03-16 RX ADMIN — VANCOMYCIN HYDROCHLORIDE 2000 MG: 1 INJECTION, POWDER, LYOPHILIZED, FOR SOLUTION INTRAVENOUS at 01:54

## 2024-03-16 RX ADMIN — DOCUSATE SODIUM 100 MG: 100 CAPSULE, LIQUID FILLED ORAL at 18:22

## 2024-03-16 RX ADMIN — SIMETHICONE 80 MG: 80 TABLET, CHEWABLE ORAL at 21:44

## 2024-03-16 RX ADMIN — SODIUM CHLORIDE, SODIUM LACTATE, POTASSIUM CHLORIDE, AND CALCIUM CHLORIDE 125 ML/HR: .6; .31; .03; .02 INJECTION, SOLUTION INTRAVENOUS at 01:54

## 2024-03-16 RX ADMIN — IBUPROFEN 600 MG: 600 TABLET ORAL at 13:14

## 2024-03-16 RX ADMIN — IBUPROFEN 600 MG: 600 TABLET ORAL at 20:11

## 2024-03-16 RX ADMIN — ACETAMINOPHEN 650 MG: 325 TABLET, FILM COATED ORAL at 09:24

## 2024-03-16 NOTE — OB LABOR/OXYTOCIN SAFETY PROGRESS
Oxytocin Safety Progress Check Note - Deysi Bauer 28 y.o. female MRN: [id number]    Unit/Bed#: -01 Encounter: [id number]    Dose (luz-units/min) Oxytocin: 12 luz-units/min  Contraction Frequency (minutes): 4  Contraction Intensity: Mild/Moderate  Uterine Activity Characteristics: Regular  Cervical Dilation: 8        Cervical Effacement: 90  Fetal Station: 0  Baseline Rate (FHR): 140 bpm  Fetal Heart Rate (FHT): 125 BPM  FHR Category: 2               Vital Signs:   Vitals:    03/15/24 2209   BP: 117/63   Pulse: 100   Resp:    Temp:    SpO2:        Notes/comments:   Patient noted to have deep late decelerations.   Dr. Israel and I called to the bedside for evaluation.   Patient was being actively resuscitated with IVF and repositioning.   SVE as above. Pt made quick cervical change.   Pitocin was initially halved and then turned off with persistent lates.   Tracing then returned to baseline. Will keep pitocin off for now.   Dr. King made aware of the tracing in real time.       Sania Nathan MD 3/15/2024 11:26 PM

## 2024-03-16 NOTE — OB LABOR/OXYTOCIN SAFETY PROGRESS
Oxytocin Safety Progress Check Note - Deysi Bauer 28 y.o. female MRN: [id number]    Unit/Bed#: -01 Encounter: [id number]    Dose (luz-units/min) Oxytocin: 12 luz-units/min  Contraction Frequency (minutes): 4  Contraction Intensity: Mild/Moderate  Uterine Activity Characteristics: Regular  Cervical Dilation: 3        Cervical Effacement: 50  Fetal Station: -3  Baseline Rate (FHR): 135 bpm  Fetal Heart Rate (FHT): 135 BPM  FHR Category: 1               Vital Signs:   Vitals:    03/15/24 1854   BP: 121/82   Pulse: 100   Resp:    Temp:    SpO2:        Notes/comments:   -AROM clear fluid  -pain intervention prn   -recheck prn     Rama King DO 3/15/2024 8:05 PM

## 2024-03-16 NOTE — DISCHARGE INSTRUCTIONS
Vaginal Delivery   WHAT YOU SHOULD KNOW:   A vaginal delivery is the birth of your baby through your vagina (birth canal).        AFTER YOU LEAVE:   Medicines:  NSAIDs  help decrease swelling and pain or fever. This medicine is available with or without a doctor's order. NSAIDs can cause stomach bleeding or kidney problems in certain people. If you take blood thinner medicine, always ask your healthcare provider if NSAIDs are safe for you. Always read the medicine label and follow directions.    Take your medicine as directed.  Call your healthcare provider if you think your medicine is not helping or if you have side effects. Tell him if you are allergic to any medicine. Keep a list of the medicines, vitamins, and herbs you take. Include the amounts, and when and why you take them. Bring the list or the pill bottles to follow-up visits. Carry your medicine list with you in case of an emergency.  Follow up with your primary healthcare provider:  Most women need to return 6 weeks after a vaginal delivery. Ask about how to care for your wounds or stitches. Write down your questions so you remember to ask them during your visits.  Activity:  Rest as much as possible. Try to keep all activities short. You may be able to do some exercise soon after you have your baby. Talk with your primary healthcare provider before you start exercising. If you work outside the home, ask when you can return to your job.  Kegel exercises:  Kegel exercises may help your vaginal and rectal muscles heal faster. You can do Kegel exercises by tightening and relaxing the muscles around your vagina. Kegel exercises help make the muscles stronger.   Breast care:  When your milk comes in, your breasts may feel full and hard. Ask how to care for your breasts, even if you are not breastfeeding.   Constipation:  Do not try to push the bowel movement out if it is too hard. High-fiber foods, extra liquids, and regular exercise can help you prevent  constipation. Examples of high-fiber foods are fruit and bran. Prune juice and water are good liquids to drink. Regular exercise helps your digestive system work. You may also be told to take over-the-counter fiber and stool softener medicines. Take these items as directed.   Hemorrhoids:  Pregnancy can cause severe hemorrhoids. You may have rectal pain because of the hemorrhoids. Ask how to prevent or treat hemorrhoids.  Perineum care:  Your perineum is the area between your vagina and anus. Keep the area clean and dry to help it heal and to prevent infection. Wash the area gently with soap and water when you bathe or shower. Rinse your perineum with warm water when you use the toilet. Your primary healthcare provider may suggest you use a warm sitz bath to help decrease pain. A sitz bath is a bathtub or basin filled to hip level. Stay in the sitz bath for 20 to 30 minutes, or as directed.   Vaginal discharge:  You will have vaginal discharge, called lochia, after your delivery. The lochia is bright red the first day or two after the birth. By the fourth day, the amount decreases, and it turns red-brown. Use a sanitary pad rather than a tampon to prevent a vaginal infection. It is normal to have lochia up to 8 weeks after your baby is born.   Monthly periods:  Your period may start again within 7 to 12 weeks after your baby is born. If you are breastfeeding, it may take longer for your period to start again. You can still get pregnant again even though you do not have your monthly period. Talk with your primary healthcare provider about a birth control method that will be good for you if you do not want to get pregnant.  Mood changes:  Many new mothers have some kind of mood changes after delivery. Some of these changes occur because of lack of sleep, hormone changes, and caring for a new baby. Some mood changes can be more serious, such as postpartum depression. Talk with your primary healthcare provider if you  feel unable to care for yourself or your baby.  Sexual activity:  You may need to avoid sex for 6 to 7 weeks after you have your baby. You may notice you have a decreased desire for sex, or sex may be painful. You may need to use a vaginal lubricant (gel) to help make sex more comfortable.  Contact your primary healthcare provider if:   You have heavy vaginal bleeding that fills 1 or more sanitary pads in 1 hour.    You have a fever.    Your pain does not go away, or gets worse.    The skin between your vagina and rectum is swollen, warm, or red.    You have swollen, hard, or painful breasts.    You feel very sad or depressed.    You feel more tired than usual.     You have questions or concerns about your condition or care.  Seek care immediately or call 911 if:   You have pus or yellow drainage coming from your vagina or wound.    You are urinating very little, or not at all.    Your arm or leg feels warm, tender, and painful. It may look swollen and red.    You feel lightheaded, have sudden and worsening chest pain, or trouble breathing. You may have more pain when you take deep breaths or cough, or you may cough up blood.  © 2014 [employer] Information is for End User's use only and may not be sold, redistributed or otherwise used for commercial purposes. All illustrations and images included in CareNotes® are the copyrighted property of [employer] or [employer].  The above information is an  only. It is not intended as medical advice for individual conditions or treatments. Talk to your doctor, nurse or pharmacist before following any medical regimen to see if it is safe and effective for you.

## 2024-03-16 NOTE — DISCHARGE SUMMARY
Discharge Summary - Deysi Bauer 28 y.o. female MRN: [id number]    Unit/Bed#: -01 Encounter: [id number]    Admission Date: 3/15/2024     Discharge Date: 3/17/2024    Patient Active Problem List   Diagnosis    H/O gestational diabetes in prior pregnancy, currently pregnant, third trimester     (spontaneous vaginal delivery)    Maternal tobacco use in third trimester    Severe obesity due to excess calories affecting pregnancy in third trimester (HCC)    Polyhydramnios affecting pregnancy in third trimester    Prenatal care insufficient, third trimester    Anemia affecting pregnancy in third trimester    Excessive fetal growth affecting management of pregnancy in third trimester    Gestational diabetes       OBGYN Practice: [facility] Course:   Deysi Bauer is a 28 y.o.  who was admitted at 38w4d for induction of labor in the setting of gestational diabetes.  On initial cervical exam, patient was 3/50/-3.  Started on a Pitocin titration.  She had artificial rupture of membranes for clear fluid.  She received an epidural for analgesia.      Delivery Findings:  Deysi delivered a viable female  on 3/16/2024  5:31 AM  via Vaginal, Spontaneous  . The delivery was uncomplicated    Baby's Weight:  ;      Apgar scores:   and   at 1 and 5 minutes, respectively  Anesthesia: Epidural ,   QBL:           was transferred to  nursery. Patient tolerated the procedure well and was transferred to recovery in stable condition.     Her post-partum course was uncomplicated.  Her post-partum pain was well controlled with oral analgesics. On day of discharge she was ambulating, voiding spontaneously, tolerating oral intake and hemodynamically stable. Mom's blood type is A positive and  Rhogam was not given.    She was discharged home on postpartum day #1 without complications. Patient was instructed to follow up with her OB as an outpatient and was given appropriate warnings  to call doctor or provider if she develops signs of infection or uncontrolled pain. Declined inpatient contraception options.    Discharge Problem List by Issue:   Gestational diabetes  Assessment & Plan  Lab Results   Component Value Date    HGBA1C 5.8 (H) 2015       Recent Labs     03/15/24  0900   POCGLU 112       Blood Sugar Average: Last 72 hrs:  (P) 112    1hr Glucola of 165  No 3hr GTT   History of GDM  Diabetic clear fluids ordered  A1GDM protocol ordered    Excessive fetal growth affecting management of pregnancy in third trimester  Assessment & Plan  24: EFW 93%, AC 99%, HC 67%  Proven to 8lb 6oz    Anemia affecting pregnancy in third trimester  Assessment & Plan  Received 2 venofer infusions  Admission Hgb 9.9    Polyhydramnios affecting pregnancy in third trimester  Assessment & Plan  From scan on 3/7/24: FILIPPO of 29.3    Maternal tobacco use in third trimester  Assessment & Plan  Declines nicotine patch    *  (spontaneous vaginal delivery)  Assessment & Plan  Admit to OBN   Clear liquid diet   F/u T&S, CBC, RPR   IVF LR 125cc/hr   GBS positive - allergy to PCNs, will begin vancomycin for ppx  SVE: 3/50/-3  Continuous fetal monitoring and tocometry   Analgesia at maternal request, not planning for epidural currently  Vertex by TAUS  Plan: pitocin          Disposition: Home    Planned Readmission: No    Discharge Medications:   Please see AVS    Discharge instructions :   -Do not place anything (no partner, tampons or douche) in your vagina for 6 weeks.  -You may walk for exercise for the first 6 weeks then gradually return to your usual activities.   -Please do not drive for 1 week if you have no stitches and for 2 weeks if you have stitches or underwent a  delivery.    -You may take baths or shower per your preference.   -Please look at your bust (breasts) in the mirror daily and call your doctor for redness or tenderness or increased warmth.   - If you have had a  section  please look at your incision daily as well and call provider for increasing redness or steady drainage from the incision.   -Please call your doctor's office if temperature > 100.4*F or 38* C, worsening pain or a foul discharge.    Follow Up:  - Follow up in 3 weeks for postpartum visit    Jelly Israel MD

## 2024-03-16 NOTE — PLAN OF CARE
Problem: PAIN - ADULT  Goal: Verbalizes/displays adequate comfort level or baseline comfort level  Description: Interventions:  - Encourage patient to monitor pain and request assistance  - Assess pain using appropriate pain scale  - Administer analgesics based on type and severity of pain and evaluate response  - Implement non-pharmacological measures as appropriate and evaluate response  - Consider cultural and social influences on pain and pain management  - Notify physician/advanced practitioner if interventions unsuccessful or patient reports new pain  Outcome: Progressing     Problem: INFECTION - ADULT  Goal: Absence or prevention of progression during hospitalization  Description: INTERVENTIONS:  - Assess and monitor for signs and symptoms of infection  - Monitor lab/diagnostic results  - Monitor all insertion sites, i.e. indwelling lines, tubes, and drains  - Monitor endotracheal if appropriate and nasal secretions for changes in amount and color  - [place] appropriate cooling/warming therapies per order  - Administer medications as ordered  - Instruct and encourage patient and family to use good hand hygiene technique  - Identify and instruct in appropriate isolation precautions for identified infection/condition  Outcome: Progressing     Problem: Knowledge Deficit  Goal: Patient/family/caregiver demonstrates understanding of disease process, treatment plan, medications, and discharge instructions  Description: Complete learning assessment and assess knowledge base.  Interventions:  - Provide teaching at level of understanding  - Provide teaching via preferred learning methods  Outcome: Progressing     Problem: DISCHARGE PLANNING  Goal: Discharge to home or other facility with appropriate resources  Description: INTERVENTIONS:  - Identify barriers to discharge w/patient and caregiver  - Arrange for needed discharge resources and transportation as appropriate  - Identify discharge learning needs (meds,  wound care, etc.)  - Arrange for interpretive services to assist at discharge as needed  - Refer to Case Management Department for coordinating discharge planning if the patient needs post-hospital services based on physician/advanced practitioner order or complex needs related to functional status, cognitive ability, or social support system  Outcome: Progressing     Problem: BIRTH - VAGINAL/ SECTION  Goal: Fetal and maternal status remain reassuring during the birth process  Description: INTERVENTIONS:  - Monitor vital signs  - Monitor fetal heart rate  - Monitor uterine activity  - Monitor labor progression (vaginal delivery)  - DVT prophylaxis  - Antibiotic prophylaxis  Outcome: Progressing  Goal: Emotionally satisfying birthing experience for mother/fetus  Description: Interventions:  - Assess, plan, implement and evaluate the nursing care given to the patient in labor  - Advocate the philosophy that each childbirth experience is a unique experience and support the family's chosen level of involvement and control during the labor process   - Actively participate in both the patient's and family's teaching of the birth process  - Consider cultural, [place] and age-specific factors and plan care for the patient in labor  Outcome: Progressing

## 2024-03-16 NOTE — L&D DELIVERY NOTE
"Vaginal Delivery Summary - OB/GYN   Deysi Bauer 28 y.o. female MRN: [id number]  Unit/Bed#: -01 Encounter: [id number]    Pre-delivery Diagnosis:   Pregnancy at 38.4wks   GDMA 1  Polyhydramnios  Gestational HTN    Post-delivery Diagnosis: same, delivered    Procedure: Spontaneous Vaginal Delivery     OBGYN Practice: [facility]    Attending Physician: Dr. King  Resident Physician: Dr. Israel    Anesthesia: Epidural ,     QBL:      105ml    Complications: none apparent    Specimens:   1. Arterial and venous cord gases  2. Cord blood  3. Segment of umbilical cord  4. Placenta to pathology    Findings:  1. Viable female  on 3/16/2024  5:31 AM  via Vaginal, Spontaneous  . with APGAR scores of   and   at 1 and 5 minutes, respectively. Weight pending at time of dictation for skin to skin bonding.  2. Spontaneous delivery of intact placenta   3. No lacerations       Gases:  Umbilical Artery  No results for input(s): \"PHCART\", \"BECART\" in the last 72 hours.    Umbilical Vein  No results for input(s): \"PHCVEN\", \"BECVEN\" in the last 72 hours.        Brief history and labor course:  Deysi Bauer is a 28 y.o. [id number] 38w4d . She presented to labor and delivery for induction of labor secondary to uncontrolled GDMA1 and polyhydramnios. Her pregnancy was complicated by the above, and gestational HTN diagnosed intrapartum. On exam in triage she was noted to be 3/50/-3.  She was induced with pitocin. Amniotomy was performed.    Description of delivery:    After pushing, Deysi delivered a viable female , wt pending as mother is doing skin to skin bonding. The fetal vertex delivered OA position spontaneously. There was one nuchal cord. The anterior shoulder delivered atraumatically with maternal expulsive forces and the assistance of gentle downward traction. The posterior shoulder delivered with maternal expulsive forces and the assistance of gentle upward traction. The remainder of the fetus " delivered spontaneously.      Upon delivery, the infant was placed on Deysi's abdomen and the cord was doubly clamped and cut. Delayed cord clamping was achieved. The infant was noted to cry spontaneously and was moving all extremities appropriately. There was no evidence for injury. Awaiting nurse resuscitators evaluated the . Arterial and venous cord blood gases and cord blood was collected for analysis. These were promptly sent to the lab. In the immediate post-partum, active management of the 3rd stage of labor was performed with massage, the administration of 30 units of IV pitocin, and gentle traction on the umbilical cord. The placenta delivered spontaneously and was noted to have a centrally inserted 3 vessel cord.  The placenta was sent to pathology.     The vagina, cervix, perineum, and rectum were inspected and there was noted to be no lacerations.    At the conclusion of the procedure, all needle, sponge, and instrument counts were noted to be correct. Dr. King was present and participated in all key portions of the case.    Disposition:  The patient and the  both tolerated the procedure well and are recovering in labor and delivery room       Rama King DO  3/16/2024  5:42 AM

## 2024-03-16 NOTE — OB LABOR/OXYTOCIN SAFETY PROGRESS
Labor Progress Note - Deysi Bauer 28 y.o. female MRN: [id number]    Unit/Bed#: -01 Encounter: [id number]    Dose (luz-units/min) Oxytocin: 2 luz-units/min (per Dr. Wall)  Contraction Frequency (minutes): 5  Contraction Intensity: Strong  Uterine Activity Characteristics: Regular  Cervical Dilation: 8        Cervical Effacement: 90  Fetal Station: 0  Baseline Rate (FHR): 140 bpm  Fetal Heart Rate (FHT): 145 BPM  FHR Category: 2               Vital Signs:   Vitals:    03/15/24 2209   BP: 117/63   Pulse: 100   Resp:    Temp:    SpO2:        Notes/comments:   Prolonged deceleration noted on monitor.   Dr. King and I at the bedside for evaluation.   SVE unchanged. Pit turned off and terbutaline administered     Tracing did return to baseline after 6 min deceleration.     Sania Wall MD 3/16/2024 12:39 AM

## 2024-03-16 NOTE — CASE MANAGEMENT
Case Management Progress Note    Patient name Deysi Bauer  Location /-01 MRN [id number]  : 1995 Date 3/16/2024       LOS (days): 1  Geometric Mean LOS (GMLOS) (days):   Days to GMLOS:        OBJECTIVE:        Current admission status: Inpatient  Preferred Pharmacy:   [facility]/pharmacy #[id number] - [place], [place] - [address]  [address] [address]  Phone: [phone] Fax: [phone]    [facility] [id number] [facility] [employer] - [facility], [place] - [address]  [address]  [address]  [facility] [address]  Phone: [phone] Fax: [phone]    Primary Care Provider: Niki Duque MD    Primary Insurance: [employer]  Secondary Insurance:     PROGRESS NOTE:    Cm consulted due to MOB interested in adoption. Cm met with mother who confirmed plan is for adoption. Cm informed paperwork from private  is not ready yet so plan is for MOB to leave with baby and adoption to be finalized outside of hospital. CM confirmed plan with MOB. MOB has everything needed for baby upon discharge. MOB has no questions or concerns for Case management at this time. MOB and baby cleared of any case management needs at this time.

## 2024-03-16 NOTE — ANESTHESIA POSTPROCEDURE EVALUATION
Post-Op Assessment Note    CV Status:  Stable  Pain Score: 0    Pain management: adequate      Post-op block assessment: no complications and catheter intact   Mental Status:  Alert and awake   Hydration Status:  Euvolemic   PONV Controlled:  Controlled   Airway Patency:  Patent     Post Op Vitals Reviewed: Yes    No anethesia notable event occurred.    Staff: CRNA             BP      Temp      Pulse     Resp      SpO2

## 2024-03-16 NOTE — ANESTHESIA PREPROCEDURE EVALUATION
Procedure:  LABOR ANALGESIA    Relevant Problems   GYN   (+) 38 weeks gestation of pregnancy      HEMATOLOGY   (+) Anemia affecting pregnancy in third trimester     H/o asthma  BMI 38     Physical Exam    Airway      TM Distance: >3 FB  Neck ROM: full     Dental   No notable dental hx     Cardiovascular      Pulmonary      Other Findings  post-pubertal.      Anesthesia Plan  ASA Score- 2     Anesthesia Type- epidural with ASA Monitors.         Additional Monitors:     Airway Plan:            Plan Factors-    Chart reviewed.   Existing labs reviewed. Patient summary reviewed.                  Induction-     Postoperative Plan-     Informed Consent- Anesthetic plan and risks discussed with patient.  I personally reviewed this patient with the CRNA. Discussed and agreed on the Anesthesia Plan with the CRNA..

## 2024-03-16 NOTE — OB LABOR/OXYTOCIN SAFETY PROGRESS
Oxytocin Safety Progress Check Note - Deysi Bauer 28 y.o. female MRN: [id number]    Unit/Bed#: -01 Encounter: [id number]    Dose (luz-units/min) Oxytocin: 12 luz-units/min  Contraction Frequency (minutes): 4  Contraction Intensity: Mild/Moderate  Uterine Activity Characteristics: Regular  Cervical Dilation: 4-5        Cervical Effacement: 60  Fetal Station: -2  Baseline Rate (FHR): 135 bpm  Fetal Heart Rate (FHT): 135 BPM  FHR Category: 1               Vital Signs:   Vitals:    03/15/24 2209   BP: 117/63   Pulse: 100   Resp:    Temp:    SpO2:        Notes/comments:   SVE as above.   Patient is currently uncomfortable despite epidural. Request rebolus  Continue pitocin titration.         Jelly Israel MD 3/15/2024 10:38 PM

## 2024-03-16 NOTE — OB LABOR/OXYTOCIN SAFETY PROGRESS
Oxytocin Safety Progress Check Note - Deysi aBuer 28 y.o. female MRN: [id number]    Unit/Bed#: -01 Encounter: [id number]    Dose (luz-units/min) Oxytocin: 10 luz-units/min  Contraction Frequency (minutes): 2-5  Contraction Intensity: Strong  Uterine Activity Characteristics: Irritability  Cervical Dilation: 10  Dilation Complete Date: 24  Dilation Complete Time: 0500  Cervical Effacement: 90  Fetal Station: 0  Baseline Rate (FHR): 135 bpm  Fetal Heart Rate (FHT): 128 BPM  FHR Category: 1               Vital Signs:   Vitals:    24 0445   BP: 106/53   Pulse: 84   Resp:    Temp:    SpO2: 99%       Notes/comments:       Anticipate   Jelly Israel MD 3/16/2024 5:00 AM

## 2024-03-16 NOTE — ANESTHESIA PROCEDURE NOTES
Epidural Block    Patient location during procedure: OB/L&D  Start time: 3/15/2024 9:49 PM  Reason for block: procedure for pain  Staffing  Performed by: Lucille Ames CRNA  Authorized by: Marisa Park MD    Preanesthetic Checklist  Completed: patient identified, IV checked, site marked, risks and benefits discussed, surgical consent, monitors and equipment checked, pre-op evaluation and timeout performed  Epidural  Patient position: sitting  Prep: ChloraPrep  Sedation Level: no sedation  Patient monitoring: frequent blood pressure checks, continuous pulse oximetry and heart rate  Approach: midline  Location: lumbar, L3-4  Injection technique: ERIKA saline  Needle  Needle type: Tuohy   Needle gauge: 17 G  Needle insertion depth: 5.5 cm  Catheter type: multi-orifice  Catheter size: 19 G  Catheter at skin depth: 12 cm  Catheter securement method: stabilization device and clear occlusive dressing  Test dose: negativelidocaine-epinephrine (XYLOCAINE-MPF/EPINEPHRINE) 1.5 %-1:200,000 injection 3 mL - Epidural   3 mL - 3/15/2024 9:50:00 PM  Assessment  Sensory level: T10  Number of attempts: 1negative aspiration for CSF, negative aspiration for heme and no paresthesia on injection  patient tolerated the procedure well with no immediate complications  Additional Notes  Single pass, smooth epidural placement

## 2024-03-16 NOTE — OB LABOR/OXYTOCIN SAFETY PROGRESS
Oxytocin Safety Progress Check Note - Deysi Bauer 28 y.o. female MRN: [id number]    Unit/Bed#: -01 Encounter: [id number]    Dose (luz-units/min) Oxytocin: 12 luz-units/min  Contraction Frequency (minutes): 4  Contraction Intensity: Mild/Moderate  Uterine Activity Characteristics: Regular  Cervical Dilation: 4-5        Cervical Effacement: 60  Fetal Station: -2  Baseline Rate (FHR): 135 bpm  Fetal Heart Rate (FHT): 135 BPM  FHR Category: 1               Vital Signs:   Vitals:    03/15/24 2130   BP: 119/63   Pulse:    Resp:    Temp:    SpO2:        Notes/comments:   SVE as above.   Patient is currently uncomfortable without epidural, requesting one at this time.    Continue pitocin titration.  Discussed with Dr. Fernando Israel MD 3/15/2024 9:35 PM

## 2024-03-17 VITALS
OXYGEN SATURATION: 98 % | HEIGHT: 63 IN | RESPIRATION RATE: 20 BRPM | WEIGHT: 216.8 LBS | TEMPERATURE: 98.2 F | HEART RATE: 87 BPM | BODY MASS INDEX: 38.41 KG/M2 | SYSTOLIC BLOOD PRESSURE: 106 MMHG | DIASTOLIC BLOOD PRESSURE: 61 MMHG

## 2024-03-17 PROBLEM — O13.9 GESTATIONAL HYPERTENSION: Status: ACTIVE | Noted: 2024-03-17

## 2024-03-17 LAB
GP B STREP DNA SPEC QL NAA+PROBE: ABNORMAL
GP B STREP DNA SPEC QL NAA+PROBE: ABNORMAL

## 2024-03-17 PROCEDURE — 99024 POSTOP FOLLOW-UP VISIT: CPT | Performed by: STUDENT IN AN ORGANIZED HEALTH CARE EDUCATION/TRAINING PROGRAM

## 2024-03-17 RX ORDER — ACETAMINOPHEN 325 MG/1
650 TABLET ORAL EVERY 4 HOURS PRN
Start: 2024-03-17

## 2024-03-17 RX ORDER — IBUPROFEN 600 MG/1
600 TABLET ORAL EVERY 6 HOURS
Start: 2024-03-17

## 2024-03-17 RX ORDER — SIMETHICONE 80 MG
80 TABLET,CHEWABLE ORAL EVERY 6 HOURS PRN
Qty: 30 TABLET | Refills: 0 | Status: SHIPPED | OUTPATIENT
Start: 2024-03-17 | End: 2024-03-20 | Stop reason: ALTCHOICE

## 2024-03-17 RX ADMIN — IBUPROFEN 600 MG: 600 TABLET ORAL at 09:55

## 2024-03-17 RX ADMIN — DOCUSATE SODIUM 100 MG: 100 CAPSULE, LIQUID FILLED ORAL at 09:55

## 2024-03-17 RX ADMIN — IBUPROFEN 600 MG: 600 TABLET ORAL at 04:13

## 2024-03-17 NOTE — ASSESSMENT & PLAN NOTE
Systolic (24hrs), Av , Min:98 , Max:118    Diastolic (24hrs), Av, Min:50, Max:66  CBC/CMP wnl

## 2024-03-17 NOTE — PLAN OF CARE
Problem: PAIN - ADULT  Goal: Verbalizes/displays adequate comfort level or baseline comfort level  Description: Interventions:  - Encourage patient to monitor pain and request assistance  - Assess pain using appropriate pain scale  - Administer analgesics based on type and severity of pain and evaluate response  - Implement non-pharmacological measures as appropriate and evaluate response  - Consider cultural and social influences on pain and pain management  - Notify physician/advanced practitioner if interventions unsuccessful or patient reports new pain  Outcome: Completed     Problem: INFECTION - ADULT  Goal: Absence or prevention of progression during hospitalization  Description: INTERVENTIONS:  - Assess and monitor for signs and symptoms of infection  - Monitor lab/diagnostic results  - Monitor all insertion sites, i.e. indwelling lines, tubes, and drains  - Monitor endotracheal if appropriate and nasal secretions for changes in amount and color  - [place] appropriate cooling/warming therapies per order  - Administer medications as ordered  - Instruct and encourage patient and family to use good hand hygiene technique  - Identify and instruct in appropriate isolation precautions for identified infection/condition  Outcome: Completed     Problem: DISCHARGE PLANNING  Goal: Discharge to home or other facility with appropriate resources  Description: INTERVENTIONS:  - Identify barriers to discharge w/patient and caregiver  - Arrange for needed discharge resources and transportation as appropriate  - Identify discharge learning needs (meds, wound care, etc.)  - Arrange for interpretive services to assist at discharge as needed  - Refer to Case Management Department for coordinating discharge planning if the patient needs post-hospital services based on physician/advanced practitioner order or complex needs related to functional status, cognitive ability, or social support system  Outcome: Completed      Problem: BIRTH - VAGINAL/ SECTION  Goal: Fetal and maternal status remain reassuring during the birth process  Description: INTERVENTIONS:  - Monitor vital signs  - Monitor fetal heart rate  - Monitor uterine activity  - Monitor labor progression (vaginal delivery)  - DVT prophylaxis  - Antibiotic prophylaxis  Outcome: Completed  Goal: Emotionally satisfying birthing experience for mother/fetus  Description: Interventions:  - Assess, plan, implement and evaluate the nursing care given to the patient in labor  - Advocate the philosophy that each childbirth experience is a unique experience and support the family's chosen level of involvement and control during the labor process   - Actively participate in both the patient's and family's teaching of the birth process  - Consider cultural, Restorationist and age-specific factors and plan care for the patient in labor  Outcome: Completed     Problem: POSTPARTUM  Goal: Experiences normal postpartum course  Description: INTERVENTIONS:  - Monitor maternal vital signs  - Assess uterine involution and lochia  Outcome: Completed  Goal: Appropriate maternal -  bonding  Description: INTERVENTIONS:  - Identify family support  - Assess for appropriate maternal/infant bonding   -Encourage maternal/infant bonding opportunities  - Referral to  or  as needed  Outcome: Completed  Goal: Establishment of infant feeding pattern  Description: INTERVENTIONS:  - Assess breast/bottle feeding  - Refer to lactation as needed  Outcome: Completed  Goal: Incision(s), wounds(s) or drain site(s) healing without S/S of infection  Description: INTERVENTIONS  - Assess and document dressing, incision, wound bed, drain sites and surrounding tissue  - Provide patient and family education  Outcome: Completed

## 2024-03-17 NOTE — NURSING NOTE
RN instructed patient to complete PPD. Patient did not complete postpartum depression screening prior to discharge.

## 2024-03-17 NOTE — PLAN OF CARE
Problem: PAIN - ADULT  Goal: Verbalizes/displays adequate comfort level or baseline comfort level  Description: Interventions:  - Encourage patient to monitor pain and request assistance  - Assess pain using appropriate pain scale  - Administer analgesics based on type and severity of pain and evaluate response  - Implement non-pharmacological measures as appropriate and evaluate response  - Consider cultural and social influences on pain and pain management  - Notify physician/advanced practitioner if interventions unsuccessful or patient reports new pain  Outcome: Progressing     Problem: INFECTION - ADULT  Goal: Absence or prevention of progression during hospitalization  Description: INTERVENTIONS:  - Assess and monitor for signs and symptoms of infection  - Monitor lab/diagnostic results  - Monitor all insertion sites, i.e. indwelling lines, tubes, and drains  - Monitor endotracheal if appropriate and nasal secretions for changes in amount and color  - [place] appropriate cooling/warming therapies per order  - Administer medications as ordered  - Instruct and encourage patient and family to use good hand hygiene technique  - Identify and instruct in appropriate isolation precautions for identified infection/condition  Outcome: Progressing     Problem: DISCHARGE PLANNING  Goal: Discharge to home or other facility with appropriate resources  Description: INTERVENTIONS:  - Identify barriers to discharge w/patient and caregiver  - Arrange for needed discharge resources and transportation as appropriate  - Identify discharge learning needs (meds, wound care, etc.)  - Arrange for interpretive services to assist at discharge as needed  - Refer to Case Management Department for coordinating discharge planning if the patient needs post-hospital services based on physician/advanced practitioner order or complex needs related to functional status, cognitive ability, or social support system  Outcome: Progressing      Problem: BIRTH - VAGINAL/ SECTION  Goal: Fetal and maternal status remain reassuring during the birth process  Description: INTERVENTIONS:  - Monitor vital signs  - Monitor fetal heart rate  - Monitor uterine activity  - Monitor labor progression (vaginal delivery)  - DVT prophylaxis  - Antibiotic prophylaxis  Outcome: Progressing  Goal: Emotionally satisfying birthing experience for mother/fetus  Description: Interventions:  - Assess, plan, implement and evaluate the nursing care given to the patient in labor  - Advocate the philosophy that each childbirth experience is a unique experience and support the family's chosen level of involvement and control during the labor process   - Actively participate in both the patient's and family's teaching of the birth process  - Consider cultural, Adventist and age-specific factors and plan care for the patient in labor  Outcome: Progressing     Problem: POSTPARTUM  Goal: Experiences normal postpartum course  Description: INTERVENTIONS:  - Monitor maternal vital signs  - Assess uterine involution and lochia  Outcome: Progressing  Goal: Appropriate maternal -  bonding  Description: INTERVENTIONS:  - Identify family support  - Assess for appropriate maternal/infant bonding   -Encourage maternal/infant bonding opportunities  - Referral to  or  as needed  Outcome: Progressing  Goal: Establishment of infant feeding pattern  Description: INTERVENTIONS:  - Assess breast/bottle feeding  - Refer to lactation as needed  Outcome: Progressing  Goal: Incision(s), wounds(s) or drain site(s) healing without S/S of infection  Description: INTERVENTIONS  - Assess and document dressing, incision, wound bed, drain sites and surrounding tissue  - Provide patient and family education  - Perform skin care/dressing changes every   Outcome: Progressing

## 2024-03-17 NOTE — PROGRESS NOTES
Progress Note - OB/GYN  Deysi Bauer 28 y.o. female MRN: [id number]  Unit/Bed#: -01 Encounter: [id number]    Assessment and Plan     Deysi Bauer is a patient of: [facility]. She is PPD# 1 s/p  spontaneous vaginal delivery  Recovering well and is stable       Gestational hypertension  Assessment & Plan  Systolic (24hrs), Av , Min:98 , Max:118      Diastolic (24hrs), Av, Min:50, Max:66      CBC/CMP wnl             Gestational diabetes  Assessment & Plan  Lab Results   Component Value Date    HGBA1C 5.8 (H) 2015       Recent Labs     03/15/24  0900   POCGLU 112       Blood Sugar Average: Last 72 hrs:  (P) 112    1hr Glucola of 165  No 3hr GTT   History of GDM  Diabetic clear fluids ordered  A1GDM protocol ordered    Excessive fetal growth affecting management of pregnancy in third trimester  Assessment & Plan  24: EFW 93%, AC 99%, HC 67%  Proven to 8lb 6oz    Anemia affecting pregnancy in third trimester  Assessment & Plan  Received 2 venofer infusions  Admission Hgb 9.9    Polyhydramnios affecting pregnancy in third trimester  Assessment & Plan  From scan on 3/7/24: FILIPPO of 29.3    Maternal tobacco use in third trimester  Assessment & Plan  Declines nicotine patch    *  (spontaneous vaginal delivery)  Assessment & Plan  Recovering well   Encourage Ambulation  GBS pos   Rh pos          Disposition    - Anticipate discharge home on PPD# 1      Subjective/Objective     Chief Complaint: Postpartum State     Subjective:    Deysi Bauer is PPD#1 s/p  spontaneous vaginal delivery. She has no current complaints.  Pain is well controlled.  Patient is currently voiding.  She is ambulating.  Patient is currently passing flatus and has had no bowel movement. She is tolerating PO, and denies nausea or vomitting. Patient denies fever, chills, chest pain, shortness of breath, or calf tenderness. Lochia is normal.She is recovering well and is stable. She desires discharge today.  "      Vitals:   /61 (BP Location: Right arm)   Pulse 87   Temp 98.2 °F (36.8 °C) (Oral)   Resp 20   Ht 5' 3\" (1.6 m)   Wt 98.3 kg (216 lb 12.8 oz)   LMP 06/20/2023   SpO2 98%   Breastfeeding No   BMI 38.40 kg/m²       Intake/Output Summary (Last 24 hours) at 3/17/2024 0648  Last data filed at 3/16/2024 1230  Gross per 24 hour   Intake --   Output 2000 ml   Net -2000 ml       Invasive Devices       Peripheral Intravenous Line  Duration             Peripheral IV 03/15/24 Left;Ventral (anterior) Forearm 1 day                    Physical Exam:   GEN: Deysi Bauer appears well, alert and oriented x 3, pleasant and cooperative   CARDIO: RRR, no murmurs or rubs  RESP:  CTAB, no wheezes or rales  ABDOMEN: soft, no tenderness, no distention, fundus @ U-2  EXTREMITIES: non tender, no erythema  Labs:     Hemoglobin   Date Value Ref Range Status   03/15/2024 9.9 (L) 11.5 - 15.4 g/dL Final   02/22/2024 9.9 (L) 11.5 - 15.4 g/dL Final   12/27/2015 9.5 (L) 11.5 - 15.4 g/dL Final   12/26/2015 10.7 (L) 11.5 - 15.4 g/dL Final     WBC   Date Value Ref Range Status   03/15/2024 15.90 (H) 4.31 - 10.16 Thousand/uL Final   02/22/2024 14.77 (H) 4.31 - 10.16 Thousand/uL Final   12/27/2015 15.92 (H) 4.31 - 10.16 Thousand/uL Final   12/26/2015 13.22 (H) 4.31 - 10.16 Thousand/uL Final     Platelets   Date Value Ref Range Status   03/15/2024 268 149 - 390 Thousands/uL Final   02/22/2024 281 149 - 390 Thousands/uL Final   12/27/2015 232 149 - 390 Thousand/uL Final   12/26/2015 277 149 - 390 Thousand/uL Final     Creatinine   Date Value Ref Range Status   03/15/2024 0.45 (L) 0.60 - 1.30 mg/dL Final     Comment:     Standardized to IDMS reference method   12/26/2015 0.45 (L) 0.60 - 1.30 mg/dL Final     Comment:     Standardized to IDMS reference method     AST   Date Value Ref Range Status   03/15/2024 14 13 - 39 U/L Final     ALT   Date Value Ref Range Status   03/15/2024 9 7 - 52 U/L Final     Comment:     Specimen collection " should occur prior to Sulfasalazine administration due to the potential for falsely depressed results.           Jelly Israel MD  3/17/2024  6:48 AM

## 2024-03-18 ENCOUNTER — HOSPITAL ENCOUNTER (OUTPATIENT)
Dept: INFUSION CENTER | Facility: CLINIC | Age: 29
Discharge: HOME | End: 2024-03-18
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
## 2024-03-20 ENCOUNTER — NURSE TRIAGE (OUTPATIENT)
Age: 29
End: 2024-03-20

## 2024-03-20 ENCOUNTER — CLINICAL SUPPORT (OUTPATIENT)
Dept: OBGYN CLINIC | Facility: CLINIC | Age: 29
End: 2024-03-20

## 2024-03-20 VITALS
DIASTOLIC BLOOD PRESSURE: 100 MMHG | SYSTOLIC BLOOD PRESSURE: 150 MMHG | HEIGHT: 63 IN | BODY MASS INDEX: 36.68 KG/M2 | WEIGHT: 207 LBS

## 2024-03-20 DIAGNOSIS — Z01.30 BP CHECK: Primary | ICD-10-CM

## 2024-03-20 DIAGNOSIS — Z87.59 HISTORY OF GESTATIONAL HYPERTENSION: ICD-10-CM

## 2024-03-20 PROBLEM — O99.333 MATERNAL TOBACCO USE IN THIRD TRIMESTER: Status: RESOLVED | Noted: 2024-01-25 | Resolved: 2024-03-20

## 2024-03-20 PROBLEM — O09.293 H/O GESTATIONAL DIABETES IN PRIOR PREGNANCY, CURRENTLY PREGNANT, THIRD TRIMESTER: Status: RESOLVED | Noted: 2023-10-11 | Resolved: 2024-03-20

## 2024-03-20 PROBLEM — E66.01 SEVERE OBESITY DUE TO EXCESS CALORIES AFFECTING PREGNANCY IN THIRD TRIMESTER (HCC): Status: RESOLVED | Noted: 2024-02-21 | Resolved: 2024-03-20

## 2024-03-20 PROBLEM — O36.63X0 EXCESSIVE FETAL GROWTH AFFECTING MANAGEMENT OF PREGNANCY IN THIRD TRIMESTER: Status: RESOLVED | Noted: 2024-03-07 | Resolved: 2024-03-20

## 2024-03-20 PROBLEM — O09.33 PRENATAL CARE INSUFFICIENT, THIRD TRIMESTER: Status: RESOLVED | Noted: 2024-02-21 | Resolved: 2024-03-20

## 2024-03-20 PROBLEM — O40.3XX0 POLYHYDRAMNIOS AFFECTING PREGNANCY IN THIRD TRIMESTER: Status: RESOLVED | Noted: 2024-02-21 | Resolved: 2024-03-20

## 2024-03-20 PROBLEM — O99.013 ANEMIA AFFECTING PREGNANCY IN THIRD TRIMESTER: Status: RESOLVED | Noted: 2024-02-26 | Resolved: 2024-03-20

## 2024-03-20 PROBLEM — Z86.32 H/O GESTATIONAL DIABETES IN PRIOR PREGNANCY, CURRENTLY PREGNANT, THIRD TRIMESTER: Status: RESOLVED | Noted: 2023-10-11 | Resolved: 2024-03-20

## 2024-03-20 PROBLEM — O99.213 SEVERE OBESITY DUE TO EXCESS CALORIES AFFECTING PREGNANCY IN THIRD TRIMESTER (HCC): Status: RESOLVED | Noted: 2024-02-21 | Resolved: 2024-03-20

## 2024-03-20 PROCEDURE — 99024 POSTOP FOLLOW-UP VISIT: CPT | Performed by: NURSE PRACTITIONER

## 2024-03-20 RX ORDER — NIFEDIPINE 30 MG/1
30 TABLET, EXTENDED RELEASE ORAL DAILY
Qty: 30 TABLET | Refills: 0 | Status: SHIPPED | OUTPATIENT
Start: 2024-03-20

## 2024-03-20 NOTE — PATIENT INSTRUCTIONS
Self Care After Delivery   AMBULATORY CARE:   The postpartum period  is the period of time from delivery to about 6 weeks. During this time you may experience many physical and emotional changes. It is important to understand what is normal and when you need to call your healthcare provider. It is also important to know how to care for yourself during this time.  Call your local emergency number (911 in the [place]) for any of the following:   You see or hear things that are not there, or have thoughts of harming yourself or your baby.    You soak through 1 pad in 15 minutes, have blurry vision, clammy or pale skin, and feel faint.    You faint or lose consciousness.    You have trouble breathing.    You cough up blood.    Your  incision comes apart.    Seek care immediately if:   Your heart is beating faster than usual.    You have a bad headache or changes in your vision.    Your episiotomy or  incision is red, swollen, bleeding, or draining pus.    You have severe abdominal pain.    Call your doctor or obstetrician if:   Your leg is painful, red, and larger than usual.    You soak through 1 or more pads in an hour, or pass blood clots larger than a quarter from your vagina.    You have a fever.    You have new or worsening pain in your abdomen or vagina.    You continue to have depression 1 to 2 weeks after you deliver.    You have trouble sleeping.    You have foul-smelling discharge from your vagina.    You have pain or burning when you urinate.    You do not have a bowel movement for 3 days or more.    You have nausea or are vomiting.    You have hard lumps or red streaks over your breasts.    You have cracked nipples or bleed from your nipples.    You have questions or concerns about your condition or care.    Physical changes:  The following are normal changes after you give birth:  Pain in the area between your anus and vagina    Breast pain    Constipation or hemorrhoids    Hot or cold  flashes    Vaginal bleeding or discharge    Mild to moderate abdominal cramping    Difficulty controlling bowel movements or urine    Emotional changes:  A drop in hormone levels after you deliver may cause changes in your emotions. You may feel irritable, sad, or anxious. You may cry easily or for no reason. You may also feel depressed. Depression that continues can be a sign of postpartum depression, a condition that can be treated. Treatment may include talk therapy, medicines, or both. Healthcare providers will ask how you are feeling and if you have any depression. These talks can happen during appointments for your medical care and for your baby's care, such as well child visits. Providers can help you find ways to care for yourself and your baby. Talk to your providers about the following:  When emotional changes or depression started, and if it is getting worse over time    Problems you are having with daily activities, sleep, or caring for your baby    If anything makes you feel worse, or makes you feel better    Feeling that you are not bonding with your baby the way you want    Any problems your baby has with sleeping or feeding    Your baby is fussy or cries a lot    Support you have from friends, family, or others    Breast care for breastfeeding mothers:  You may have sore breasts for 3 to 6 days after you give birth. This happens as your milk begins to fill your breasts. You may also have sore breasts if you do not breastfeed frequently. Do the following to care for your breasts:  Apply a moist, warm, compress to your breast as directed.  This may help soothe your breasts. Make sure the washcloth is not too hot before you apply it to your breast.    Nurse your baby or pump your milk frequently.  This may prevent clogged milk ducts. Ask your healthcare provider how often to nurse or pump.    Massage your breasts as directed.  This may help increase your milk flow. Gently rub your breasts in a circular  motion before you breastfeed. You may need to gently squeeze your breast or nipple to help release milk. You can also use a breast pump to help release milk from your breast.    Wash your breasts with warm water only.  Do not put soap on your nipples. Soap may cause your nipples to become dry.    Apply lanolin cream to your nipples as directed.  Lanolin cream may add moisture to your skin and prevent nipple dryness. Always  wash off lanolin cream with warm water before you breastfeed.    Place pads in your bra.  Your nipples may leak milk when you are not breastfeeding. You can place pads inside of your bra to help prevent leaking onto your clothing. Ask your healthcare provider where to purchase bra pads.    Get breastfeeding support if needed.  Healthcare providers can answer questions about breastfeeding and provide you with support. Ask your healthcare provider who you can contact if you need breastfeeding support.    Breast care for non-breastfeeding mothers:  Milk will fill your breasts even if you bottle feed your baby. Do the following to help stop your milk from filling your breasts and causing pain:  Wear a bra with support at all times.  A sports bra or a tight-fitting bra will help stop your milk from coming in.    Apply ice on each breast for 15 to 20 minutes every hour or as directed.  Use an ice pack, or put crushed ice in a plastic bag. Cover it with a towel before you apply it to your breast. Ice helps your milk ducts shrink.    Keep your breasts away from warm water.  Warm water will make it easier for milk to fill your breasts. Stand with your breasts away from warm water in the shower.    Limit how much you touch your breasts.  This will prevent them from filling with milk.    Perineum care:  Your perineum is the area between your rectum and vagina. It is normal to have swelling and pain in this area after you give birth. If you had an episiotomy, your healthcare provider may give you special  instructions.  Clean your perineum after you use the bathroom.  This may prevent infection and help with healing. Use a spray bottle with warm water to clean your perineum. You may also gently spray warm water against your perineum when you urinate. Always wipe front to back.    Take a sitz bath as directed.  A sitz bath may help relieve swelling and pain. Fill your bath tub or bucket with water up to your hips and sit in the water. Use cold water for 2 days after you deliver. Then use warm water. Ask your healthcare provider for more information about a sitz bath.    Apply ice packs for the first 24 hours or as directed.  Use a plastic glove filled with ice or buy an ice pack. Wrap the ice pack or plastic glove in a small towel or wash cloth. Place the ice pack on your perineum for 20 minutes at a time.    Sit on a donut-shaped pillow.  This may relieve pressure on your perineum when you sit.    Use wipes that contain medicine or take pills as directed.  Your healthcare provider may tell you to use witch hazel pads. You can place witch hazel pads in the refrigerator before you apply them to your perineum. Your provider may also tell you to take NSAIDs. Ask him or her how often to take pills or use the wipes.    Do not go swimming or take tub baths for 4 to 6 weeks or as directed.  This will help prevent an infection in your vagina or uterus.    Bowel and bladder care:  It may take 3 to 5 days to have a bowel movement after you deliver your baby. You can do the following to prevent or manage constipation, and get control of your bowel or bladder:  Take stool softeners as directed.  A stool softener is medicine that will make your bowel movements softer. This may prevent or relieve constipation. A stool softener may also make bowel movements less painful.    Drink plenty of liquids.  Ask how much liquid to drink each day and which liquids are best for you. Liquids may help prevent constipation.    Eat foods high in  fiber.  Examples include fruits, vegetables, grains, beans, and lentils. Ask your healthcare provider how much fiber you need each day. Fiber may prevent constipation.         Do Kegel exercises as directed.  Kegel exercises will help strengthen the muscles that control bowel movements and urination. Ask your healthcare provider for more information on Kegel exercises.    Apply cold compresses or medicine to hemorrhoids as directed.  This may relieve swelling and pain. Your healthcare provider may tell you to apply ice or wipes that contain medicine to your hemorrhoids. He or she may also tell you to use a sitz bath. Ask your provider for more information on how to manage hemorrhoids.    Nutrition:  Good nutrition is important in the postpartum period. It will help you return to a healthy weight, increase your energy levels, and prevent constipation. It will also help you get enough nutrients and calories if you are going to breastfeed your baby.  Eat a variety of healthy foods.  Healthy foods include fruits, vegetables, whole-grain breads, low-fat dairy products, beans, lean meats, and fish. You may need 500 to 700 extra calories each day if you breastfeed your baby. You may also need extra protein.         Limit foods with added sugar and high amounts of fat.  These foods are high in calories and low in healthy nutrients. Read food labels so you know how much sugar and fat is in the food you want to eat.    Drink 8 to 10 glasses of water per day.  Water will help you make plenty of milk for your baby. It will also help prevent constipation. Drink a glass of water every time you breastfeed your baby.    Take vitamins as directed.  Ask your healthcare provider what vitamins you need.    Limit caffeine and alcohol if you are breastfeeding.  Caffeine and alcohol can get into your breast milk. Caffeine and alcohol can make your baby fussy. They can also interfere with your baby's sleep. Ask your healthcare provider if  you can drink alcohol or caffeine.    Rest and sleep:  You may feel very tired in the postpartum period. Enough sleep will help you heal and give you energy to care for your baby. The following may help you get sleep and rest:  Nap when your baby naps.  Your baby may nap several times during the day. Get rest during this time.    Limit visitors.  Many people may want to see you and your baby. Ask friends or family to visit on different days. This will give you time to rest.    Do not plan too much for one day.  Put off household chores so that you have time to rest. Gradually do more each day.    Ask for help from family, friends, or neighbors.  Ask them to help you with laundry, cleaning, or errands. Also ask someone to watch the baby while you take a nap or relax. Ask your partner to help with the care of your baby. Pump some of your breast milk so your partner can feed your baby during the night.    Exercise after delivery:  Wait until your healthcare provider says it is okay to exercise. Exercise can help you lose weight, increase your energy levels, and manage your mood. It can also prevent constipation and blood clots. Start with gentle exercises such as walking. Do more as you have more energy. You may need to avoid abdominal exercises for 1 to 2 weeks after you deliver. Talk to your healthcare provider about an exercise plan that is right for you.       Sexual activity after delivery:   Do not have sex until your healthcare provider says it is okay. You may need to wait 4 to 6 weeks before you have sex. This may prevent infection and allow time to heal.    Your menstrual cycle may begin as soon as 3 weeks after you deliver. Your period may be delayed if you breastfeed your baby. You can become pregnant before you get your first postpartum period. Talk to your healthcare provider about birth control that is right for you. Some types of birth control are not safe during breastfeeding.    For support and more  information:  Join a support group for new mothers. Ask for help from family and friends with chores, errands, and care of your baby.  Office of Women's Health,  Department of Health and Human Services  [address] [place] [address] [address] [place] [address]  [address] [facility] [address], [place] [address]  Phone: [phone]  Web Address: www.womenshealth.gov  [facility]partum [facility]  [address] [place] [address]  Web Address: [email or web address]  Follow up with your doctor or obstetrician as directed:  You will need to follow up within 2 to 6 weeks of delivery. Write down your questions so you remember to ask them at your visits.  © Copyright [employer] 2023 Information is for End User's use only and may not be sold, redistributed or otherwise used for commercial purposes.  The above information is an  only. It is not intended as medical advice for individual conditions or treatments. Talk to your doctor, nurse or pharmacist before following any medical regimen to see if it is safe and effective for you.

## 2024-03-20 NOTE — TELEPHONE ENCOUNTER
"Patient called states she is post partum - vaginal delivery 3/16. Patient called states she had high blood pressure reading of 148/96 and swelling. Swollen legs, feet, hands and face. Patient denies headache, vision changes, or r upper quadrant abdominal pain. Pt states has some abdominal pain but has hernia around naval. No HX of pre e. Patient took BP again 5 min later and 146/94 about 30 minutes ago. Advised will reach out to provider on call for further recommendation. Patient verbalized understanding.    TT sent to on call provider who recommends patient to be seen in office for BP check today. Called patient back - BP check appointment scheduled today. Care advice reviewed. Patient verbalized understanding.    Reason for Disposition   Preeclampsia or high blood pressure during pregnancy    Answer Assessment - Initial Assessment Questions  1. ONSET: \"When did the swelling start?\" (e.g., minutes, hours, days)      Started after delivery, but worsened today  2. LOCATION: \"What part of the leg is swollen?\"  \"Are both legs swollen or just one leg?\"      Both swollen equally, both feet, hands, and face  3. SEVERITY: \"How bad is the swelling?\" (e.g., localized; mild, moderate, severe)   - Localized - small area of swelling localized to one leg   - MILD pedal edema - swelling limited to foot and ankle, pitting edema < 1/4 inch (6 mm) deep, rest and elevation eliminate most or all swelling   - MODERATE edema - swelling of lower leg to knee, pitting edema > 1/4 inch (6 mm) deep, rest and elevation only partially reduce swelling   - SEVERE edema - swelling extends above knee, facial or hand swelling present       severe  4. REDNESS: \"Does the swelling look red or infected?\"      denies  5. PAIN: \"Is the swelling painful to touch?\" If Yes, ask: \"How painful is it?\"   (Scale 1-10; mild, moderate or severe)      denies  6. FEVER: \"Do you have a fever?\" If Yes, ask: \"What is it, how was it measured, and when did it start?\" " "      denies  7. MEDICAL HISTORY: \"Do you have a history of blood clots, heart failure, kidney disease, liver failure, or cancer?\"      denies  8. OTHER SYMPTOMS: \"Do you have any other symptoms?\" (e.g., chest pain, difficulty breathing)      denies  9. DELIVERY DATE: \"When was your delivery date?\" \"Vaginal delivery or ?\"      Vaginal delivery 3/16    Protocols used: Postpartum - Leg Swelling and Edema-ADULT-AH    "

## 2024-03-20 NOTE — PROGRESS NOTES
Assessment/Plan:      Diagnoses and all orders for this visit:    BP check  -     NIFEdipine (PROCARDIA XL) 30 mg 24 hr tablet; Take 1 tablet (30 mg total) by mouth daily    History of gestational hypertension  -     NIFEdipine (PROCARDIA XL) 30 mg 24 hr tablet; Take 1 tablet (30 mg total) by mouth daily     After discussion with Dr. Murdock plan is to start the patient on an antihypertensive medication and return in a few days to recheck her blood pressure. She was encouraged to continue her blood pressure checks at home and call with any numbers that are high.  She was advised to monitor her salt intake. She was advised to return on Friday for another blood pressure check.    Subjective:     Patient ID: Deysi Bauer is a 28 y.o. female.    BP on arrival 150/100  Repeat /90.    Patient is here for postpartum blood pressure check.  She denies headaches, right upper quadrant abdominal pain and is taking her blood pressure at home and getting similar numbers. Reports ankle edema which was not noted on exam.        Review of Systems   Constitutional:  Negative for fever.   Cardiovascular:  Negative for chest pain and palpitations.   Gastrointestinal:  Negative for abdominal pain.   Skin:  Negative for color change.   Neurological:  Negative for dizziness.   Psychiatric/Behavioral:  Negative for agitation.        Vitals:    03/20/24 1348   BP: 150/100       Objective:     Physical Exam  Vitals and nursing note reviewed.   Constitutional:       General: She is not in acute distress.     Appearance: She is well-developed. She is not diaphoretic.   HENT:      Head: Normocephalic and atraumatic.   Eyes:      Conjunctiva/sclera: Conjunctivae normal.   Pulmonary:      Effort: Pulmonary effort is normal.   Abdominal:      Palpations: Abdomen is soft.   Musculoskeletal:         General: Normal range of motion.      Cervical back: Normal range of motion and neck supple.   Skin:     General: Skin is warm and dry.    Neurological:      Mental Status: She is alert and oriented to person, place, and time.

## 2024-03-21 PROCEDURE — 88313 SPECIAL STAINS GROUP 2: CPT | Performed by: PATHOLOGY

## 2024-03-21 PROCEDURE — 88307 TISSUE EXAM BY PATHOLOGIST: CPT | Performed by: PATHOLOGY

## 2024-03-22 LAB — SCAN RESULT: NORMAL
